# Patient Record
Sex: FEMALE | Race: WHITE | NOT HISPANIC OR LATINO | URBAN - METROPOLITAN AREA
[De-identification: names, ages, dates, MRNs, and addresses within clinical notes are randomized per-mention and may not be internally consistent; named-entity substitution may affect disease eponyms.]

---

## 2022-01-22 ENCOUNTER — EMERGENCY (EMERGENCY)
Facility: HOSPITAL | Age: 52
LOS: 1 days | Discharge: LEFT BEFORE DISCHARGE | End: 2022-01-22
Attending: STUDENT IN AN ORGANIZED HEALTH CARE EDUCATION/TRAINING PROGRAM
Payer: COMMERCIAL

## 2022-01-22 VITALS
DIASTOLIC BLOOD PRESSURE: 94 MMHG | HEART RATE: 74 BPM | OXYGEN SATURATION: 98 % | WEIGHT: 199.96 LBS | RESPIRATION RATE: 20 BRPM | TEMPERATURE: 98 F | HEIGHT: 64 IN | SYSTOLIC BLOOD PRESSURE: 173 MMHG

## 2022-01-22 PROCEDURE — 99284 EMERGENCY DEPT VISIT MOD MDM: CPT

## 2022-01-22 PROCEDURE — 99282 EMERGENCY DEPT VISIT SF MDM: CPT

## 2022-01-22 RX ORDER — SODIUM CHLORIDE 9 MG/ML
1000 INJECTION, SOLUTION INTRAVENOUS ONCE
Refills: 0 | Status: DISCONTINUED | OUTPATIENT
Start: 2022-01-22 | End: 2022-01-27

## 2022-01-22 RX ORDER — ONDANSETRON 8 MG/1
4 TABLET, FILM COATED ORAL ONCE
Refills: 0 | Status: DISCONTINUED | OUTPATIENT
Start: 2022-01-22 | End: 2022-01-27

## 2022-01-22 RX ORDER — MORPHINE SULFATE 50 MG/1
4 CAPSULE, EXTENDED RELEASE ORAL ONCE
Refills: 0 | Status: COMPLETED | OUTPATIENT
Start: 2022-01-22 | End: 2022-01-22

## 2022-01-22 NOTE — ED STATDOCS - PROGRESS NOTE DETAILS
Patent stated she felt much better then when she presented. Patient was informed that I would re-evaluate her but she needed to use the bathroom. Patient walked out after using the bathroom prior to my re-evaluation. She was noted to walk out by nursing staff stating she no longer wants to wait

## 2022-01-22 NOTE — ED STATDOCS - OBJECTIVE STATEMENT
52 y/o female with PMHx of kidney stone, c/o flank pain. Patient was recently dx UTI 3 weeks ago and finished a of 7 day course of meds. Patient reports still having a urgency to urinate and left flank pain which started 2 hours prior. Patient states calling PCP for more medication and  no longer having blood in urine. Patient states pain is similar to when she had a kidney stone in the past. Patient was +covid19 on 12/29. Denies fever or chills.

## 2023-06-08 ENCOUNTER — EMERGENCY (EMERGENCY)
Facility: HOSPITAL | Age: 53
LOS: 1 days | Discharge: HOME | End: 2023-06-08
Attending: EMERGENCY MEDICINE
Payer: COMMERCIAL

## 2023-06-08 VITALS
TEMPERATURE: 98 F | DIASTOLIC BLOOD PRESSURE: 99 MMHG | SYSTOLIC BLOOD PRESSURE: 155 MMHG | RESPIRATION RATE: 20 BRPM | HEIGHT: 64 IN | OXYGEN SATURATION: 99 % | WEIGHT: 220.02 LBS | HEART RATE: 106 BPM

## 2023-06-08 PROCEDURE — 99284 EMERGENCY DEPT VISIT MOD MDM: CPT

## 2023-06-09 VITALS — TEMPERATURE: 98 F | HEART RATE: 75 BPM

## 2023-06-09 LAB
ALBUMIN SERPL ELPH-MCNC: 4.4 G/DL — SIGNIFICANT CHANGE UP (ref 3.3–5.2)
ALP SERPL-CCNC: 93 U/L — SIGNIFICANT CHANGE UP (ref 40–120)
ALT FLD-CCNC: 19 U/L — SIGNIFICANT CHANGE UP
ANION GAP SERPL CALC-SCNC: 12 MMOL/L — SIGNIFICANT CHANGE UP (ref 5–17)
APPEARANCE UR: CLEAR — SIGNIFICANT CHANGE UP
AST SERPL-CCNC: 19 U/L — SIGNIFICANT CHANGE UP
BACTERIA # UR AUTO: ABNORMAL
BASOPHILS # BLD AUTO: 0.02 K/UL — SIGNIFICANT CHANGE UP (ref 0–0.2)
BASOPHILS NFR BLD AUTO: 0.2 % — SIGNIFICANT CHANGE UP (ref 0–2)
BILIRUB SERPL-MCNC: 0.5 MG/DL — SIGNIFICANT CHANGE UP (ref 0.4–2)
BILIRUB UR-MCNC: ABNORMAL
BUN SERPL-MCNC: 17.7 MG/DL — SIGNIFICANT CHANGE UP (ref 8–20)
CALCIUM SERPL-MCNC: 9.6 MG/DL — SIGNIFICANT CHANGE UP (ref 8.4–10.5)
CHLORIDE SERPL-SCNC: 101 MMOL/L — SIGNIFICANT CHANGE UP (ref 96–108)
CO2 SERPL-SCNC: 24 MMOL/L — SIGNIFICANT CHANGE UP (ref 22–29)
COLOR SPEC: ABNORMAL
CREAT SERPL-MCNC: 1.1 MG/DL — SIGNIFICANT CHANGE UP (ref 0.5–1.3)
DIFF PNL FLD: ABNORMAL
EGFR: 59 ML/MIN/1.73M2 — LOW
EOSINOPHIL # BLD AUTO: 0.01 K/UL — SIGNIFICANT CHANGE UP (ref 0–0.5)
EOSINOPHIL NFR BLD AUTO: 0.1 % — SIGNIFICANT CHANGE UP (ref 0–6)
EPI CELLS # UR: SIGNIFICANT CHANGE UP
GLUCOSE SERPL-MCNC: 132 MG/DL — HIGH (ref 70–99)
GLUCOSE UR QL: NEGATIVE — SIGNIFICANT CHANGE UP
HCG SERPL-ACNC: <4 MIU/ML — SIGNIFICANT CHANGE UP
HCT VFR BLD CALC: 39.2 % — SIGNIFICANT CHANGE UP (ref 34.5–45)
HGB BLD-MCNC: 13.2 G/DL — SIGNIFICANT CHANGE UP (ref 11.5–15.5)
IMM GRANULOCYTES NFR BLD AUTO: 0.4 % — SIGNIFICANT CHANGE UP (ref 0–0.9)
KETONES UR-MCNC: ABNORMAL
LEUKOCYTE ESTERASE UR-ACNC: ABNORMAL
LYMPHOCYTES # BLD AUTO: 1.76 K/UL — SIGNIFICANT CHANGE UP (ref 1–3.3)
LYMPHOCYTES # BLD AUTO: 17.3 % — SIGNIFICANT CHANGE UP (ref 13–44)
MCHC RBC-ENTMCNC: 31 PG — SIGNIFICANT CHANGE UP (ref 27–34)
MCHC RBC-ENTMCNC: 33.7 GM/DL — SIGNIFICANT CHANGE UP (ref 32–36)
MCV RBC AUTO: 92 FL — SIGNIFICANT CHANGE UP (ref 80–100)
MONOCYTES # BLD AUTO: 0.77 K/UL — SIGNIFICANT CHANGE UP (ref 0–0.9)
MONOCYTES NFR BLD AUTO: 7.6 % — SIGNIFICANT CHANGE UP (ref 2–14)
NEUTROPHILS # BLD AUTO: 7.58 K/UL — HIGH (ref 1.8–7.4)
NEUTROPHILS NFR BLD AUTO: 74.4 % — SIGNIFICANT CHANGE UP (ref 43–77)
NITRITE UR-MCNC: POSITIVE
PH UR: 6.5 — SIGNIFICANT CHANGE UP (ref 5–8)
PLATELET # BLD AUTO: 292 K/UL — SIGNIFICANT CHANGE UP (ref 150–400)
POTASSIUM SERPL-MCNC: 4.5 MMOL/L — SIGNIFICANT CHANGE UP (ref 3.5–5.3)
POTASSIUM SERPL-SCNC: 4.5 MMOL/L — SIGNIFICANT CHANGE UP (ref 3.5–5.3)
PROT SERPL-MCNC: 7 G/DL — SIGNIFICANT CHANGE UP (ref 6.6–8.7)
PROT UR-MCNC: 15
RBC # BLD: 4.26 M/UL — SIGNIFICANT CHANGE UP (ref 3.8–5.2)
RBC # FLD: 13.4 % — SIGNIFICANT CHANGE UP (ref 10.3–14.5)
RBC CASTS # UR COMP ASSIST: ABNORMAL /HPF (ref 0–4)
SODIUM SERPL-SCNC: 137 MMOL/L — SIGNIFICANT CHANGE UP (ref 135–145)
SP GR SPEC: 1.01 — SIGNIFICANT CHANGE UP (ref 1.01–1.02)
UROBILINOGEN FLD QL: 12
WBC # BLD: 10.18 K/UL — SIGNIFICANT CHANGE UP (ref 3.8–10.5)
WBC # FLD AUTO: 10.18 K/UL — SIGNIFICANT CHANGE UP (ref 3.8–10.5)
WBC UR QL: SIGNIFICANT CHANGE UP /HPF (ref 0–5)

## 2023-06-09 PROCEDURE — 85025 COMPLETE CBC W/AUTO DIFF WBC: CPT

## 2023-06-09 PROCEDURE — 36415 COLL VENOUS BLD VENIPUNCTURE: CPT

## 2023-06-09 PROCEDURE — 80053 COMPREHEN METABOLIC PANEL: CPT

## 2023-06-09 PROCEDURE — 96375 TX/PRO/DX INJ NEW DRUG ADDON: CPT

## 2023-06-09 PROCEDURE — 99284 EMERGENCY DEPT VISIT MOD MDM: CPT | Mod: 25

## 2023-06-09 PROCEDURE — 87086 URINE CULTURE/COLONY COUNT: CPT

## 2023-06-09 PROCEDURE — 84702 CHORIONIC GONADOTROPIN TEST: CPT

## 2023-06-09 PROCEDURE — 74176 CT ABD & PELVIS W/O CONTRAST: CPT | Mod: 26,MA

## 2023-06-09 PROCEDURE — 96374 THER/PROPH/DIAG INJ IV PUSH: CPT

## 2023-06-09 PROCEDURE — 74176 CT ABD & PELVIS W/O CONTRAST: CPT | Mod: MA

## 2023-06-09 PROCEDURE — 81001 URINALYSIS AUTO W/SCOPE: CPT

## 2023-06-09 RX ORDER — CEFTRIAXONE 500 MG/1
1000 INJECTION, POWDER, FOR SOLUTION INTRAMUSCULAR; INTRAVENOUS ONCE
Refills: 0 | Status: COMPLETED | OUTPATIENT
Start: 2023-06-09 | End: 2023-06-09

## 2023-06-09 RX ORDER — PHENAZOPYRIDINE HCL 100 MG
200 TABLET ORAL ONCE
Refills: 0 | Status: COMPLETED | OUTPATIENT
Start: 2023-06-09 | End: 2023-06-09

## 2023-06-09 RX ORDER — TAMSULOSIN HYDROCHLORIDE 0.4 MG/1
1 CAPSULE ORAL
Qty: 7 | Refills: 0
Start: 2023-06-09 | End: 2023-06-15

## 2023-06-09 RX ORDER — IBUPROFEN 200 MG
1 TABLET ORAL
Qty: 28 | Refills: 0
Start: 2023-06-09 | End: 2023-06-15

## 2023-06-09 RX ORDER — CEFTRIAXONE 500 MG/1
1000 INJECTION, POWDER, FOR SOLUTION INTRAMUSCULAR; INTRAVENOUS ONCE
Refills: 0 | Status: DISCONTINUED | OUTPATIENT
Start: 2023-06-09 | End: 2023-06-09

## 2023-06-09 RX ORDER — OXYCODONE AND ACETAMINOPHEN 5; 325 MG/1; MG/1
1 TABLET ORAL
Qty: 9 | Refills: 0
Start: 2023-06-09 | End: 2023-06-11

## 2023-06-09 RX ORDER — CEFPODOXIME PROXETIL 100 MG
1 TABLET ORAL
Qty: 14 | Refills: 0
Start: 2023-06-09 | End: 2023-06-15

## 2023-06-09 RX ORDER — ONDANSETRON 8 MG/1
4 TABLET, FILM COATED ORAL ONCE
Refills: 0 | Status: COMPLETED | OUTPATIENT
Start: 2023-06-09 | End: 2023-06-09

## 2023-06-09 RX ORDER — SODIUM CHLORIDE 9 MG/ML
1000 INJECTION INTRAMUSCULAR; INTRAVENOUS; SUBCUTANEOUS ONCE
Refills: 0 | Status: COMPLETED | OUTPATIENT
Start: 2023-06-09 | End: 2023-06-09

## 2023-06-09 RX ORDER — KETOROLAC TROMETHAMINE 30 MG/ML
15 SYRINGE (ML) INJECTION ONCE
Refills: 0 | Status: DISCONTINUED | OUTPATIENT
Start: 2023-06-09 | End: 2023-06-09

## 2023-06-09 RX ADMIN — CEFTRIAXONE 1000 MILLIGRAM(S): 500 INJECTION, POWDER, FOR SOLUTION INTRAMUSCULAR; INTRAVENOUS at 04:00

## 2023-06-09 RX ADMIN — ONDANSETRON 4 MILLIGRAM(S): 8 TABLET, FILM COATED ORAL at 02:06

## 2023-06-09 RX ADMIN — Medication 200 MILLIGRAM(S): at 02:28

## 2023-06-09 RX ADMIN — Medication 15 MILLIGRAM(S): at 02:07

## 2023-06-09 RX ADMIN — SODIUM CHLORIDE 1000 MILLILITER(S): 9 INJECTION INTRAMUSCULAR; INTRAVENOUS; SUBCUTANEOUS at 02:09

## 2023-06-09 NOTE — ED PROVIDER NOTE - PATIENT PORTAL LINK FT
You can access the FollowMyHealth Patient Portal offered by [facility] by registering at the following website: [email or web address]. By joining [employer]’s FollowMyHealth portal, you will also be able to view your health information using other applications (apps) compatible with our system.

## 2023-06-09 NOTE — ED PROVIDER NOTE - ATTENDING APP SHARED VISIT CONTRIBUTION OF CARE
52 yo F with hx of renal stones c/o dysuria x last 6 weeks.  Pt Rx'd with Abx despite reported neg UA by PMD, currently on Pyridium for pain.  Today with increased R flank pain with N/V.  no fever or chills.  Will check labs, UA, CT renal stone hunt and medicate for pain and re-eval

## 2023-06-09 NOTE — ED PROVIDER NOTE - CLINICAL SUMMARY MEDICAL DECISION MAKING FREE TEXT BOX
54yo F p/w dysuria, R flank pain, and N/V. on initial eval, non-toxic appearing female found sitting in stretcher without apparent distress, mild RLQ and R flank ttp, no CVAT, remainder of PE WNL. ordered IVF, Toradol and an for sx relief. ordered labs, UA/UC and CT renal to r/o pyelo vs renal stone. 52yo F p/w dysuria, R flank pain, and N/V. on initial eval, non-toxic appearing female found sitting in stretcher without apparent distress, mild RLQ and R flank ttp, no CVAT, remainder of PE WNL. ordered IVF, Toradol and an for sx relief. labs WNL, no leukocytosis, renal fnx norm. UA/UC positive for UTI. CT revealed 7mm stone in UVJ. ordered ceftriaxone and sent abx to pharm. referred to uro. discussed supportive care measures and return precautions. pt verbalized understanding and agreement

## 2023-06-09 NOTE — ED PROVIDER NOTE - CARE PROVIDER_API CALL
Rey Lyn  Urology  [address], [address]  [place] [address]  Phone: [phone]  Fax: [phone]  Follow Up Time:

## 2023-06-09 NOTE — ED PROVIDER NOTE - OBJECTIVE STATEMENT
54yo F PMH breast cancer (in remission x7y), fibromyalgia presents to ED c/o R flank pain and dysuria x6w. pain radiated to RLQ and R side of back. pt also reports hematuria. pt f/u with PCP: has had multiple UA/UC which were negative, pt was tx with PO abx, finished dose 10d ago- pt reports improvement while on abx but pain reoccurred shortly after finishing dose. pt had renal US 3w ago which was neg for pyelo or renal stone. pt has apt with nephrologist in July and was referred to urologist by PCP. pt reports having kidney stone 1y ago, tx with stents. pain is partly removed with OTC pain meds and Azo. pt reports coming to ED today bc pain was the worst pain she has ever felt and was accompanied by N/V (3 episodes). pt denies fever, urinary frequency/urgency

## 2023-06-09 NOTE — ED PROVIDER NOTE - PHYSICAL EXAMINATION
General: non-toxic appearing, in no acute distress, A+Ox3  CV: RRR, nl s1/s2.  Resp: CTAB, normal rate and effort  GI: Abdomen soft, obese, mild left flank and LLQ ttp  : No CVAT.

## 2023-06-09 NOTE — ED ADULT NURSE NOTE - OBJECTIVE STATEMENT
pt is a 53y female aox4, ambulatory.  c/o right flank pain with nausea, vomiting since this morning.  pt has hx kidney stones.  also c/o dysuria.  denies sob, cp, fever, body aches, chills.  no s/s acute distress noted.

## 2023-06-11 LAB
CULTURE RESULTS: SIGNIFICANT CHANGE UP
SPECIMEN SOURCE: SIGNIFICANT CHANGE UP

## 2024-09-22 ENCOUNTER — TRANSCRIPTION ENCOUNTER (OUTPATIENT)
Age: 54
End: 2024-09-22

## 2024-09-23 ENCOUNTER — INPATIENT (INPATIENT)
Facility: HOSPITAL | Age: 54
LOS: 2 days | Discharge: HOME | DRG: 659 | End: 2024-09-26
Attending: UROLOGY | Admitting: UROLOGY
Payer: COMMERCIAL

## 2024-09-23 VITALS
TEMPERATURE: 99 F | OXYGEN SATURATION: 96 % | HEART RATE: 136 BPM | WEIGHT: 176.15 LBS | DIASTOLIC BLOOD PRESSURE: 94 MMHG | SYSTOLIC BLOOD PRESSURE: 142 MMHG | RESPIRATION RATE: 18 BRPM

## 2024-09-23 DIAGNOSIS — Z98.890 OTHER SPECIFIED POSTPROCEDURAL STATES: Chronic | ICD-10-CM

## 2024-09-23 DIAGNOSIS — N20.0 CALCULUS OF KIDNEY: ICD-10-CM

## 2024-09-23 LAB
ALBUMIN SERPL ELPH-MCNC: 3.9 G/DL — SIGNIFICANT CHANGE UP (ref 3.3–5.2)
ALP SERPL-CCNC: 87 U/L — SIGNIFICANT CHANGE UP (ref 40–120)
ALT FLD-CCNC: 39 U/L — HIGH
ANION GAP SERPL CALC-SCNC: 14 MMOL/L — SIGNIFICANT CHANGE UP (ref 5–17)
APPEARANCE UR: CLEAR — SIGNIFICANT CHANGE UP
APTT BLD: 29.5 SEC — SIGNIFICANT CHANGE UP (ref 24.5–35.6)
AST SERPL-CCNC: 40 U/L — HIGH
BACTERIA # UR AUTO: ABNORMAL /HPF
BASOPHILS # BLD AUTO: 0.02 K/UL — SIGNIFICANT CHANGE UP (ref 0–0.2)
BASOPHILS NFR BLD AUTO: 0.3 % — SIGNIFICANT CHANGE UP (ref 0–2)
BILIRUB SERPL-MCNC: 0.4 MG/DL — SIGNIFICANT CHANGE UP (ref 0.4–2)
BILIRUB UR-MCNC: NEGATIVE — SIGNIFICANT CHANGE UP
BUN SERPL-MCNC: 11 MG/DL — SIGNIFICANT CHANGE UP (ref 8–20)
CALCIUM SERPL-MCNC: 8.8 MG/DL — SIGNIFICANT CHANGE UP (ref 8.4–10.5)
CAST: 0 /LPF — SIGNIFICANT CHANGE UP (ref 0–4)
CHLORIDE SERPL-SCNC: 103 MMOL/L — SIGNIFICANT CHANGE UP (ref 96–108)
CO2 SERPL-SCNC: 19 MMOL/L — LOW (ref 22–29)
COLOR SPEC: YELLOW — SIGNIFICANT CHANGE UP
CREAT SERPL-MCNC: 0.69 MG/DL — SIGNIFICANT CHANGE UP (ref 0.5–1.3)
DIFF PNL FLD: ABNORMAL
EGFR: 102 ML/MIN/1.73M2 — SIGNIFICANT CHANGE UP
EOSINOPHIL # BLD AUTO: 0.01 K/UL — SIGNIFICANT CHANGE UP (ref 0–0.5)
EOSINOPHIL NFR BLD AUTO: 0.1 % — SIGNIFICANT CHANGE UP (ref 0–6)
GAS PNL BLDV: SIGNIFICANT CHANGE UP
GLUCOSE SERPL-MCNC: 129 MG/DL — HIGH (ref 70–99)
GLUCOSE UR QL: NEGATIVE MG/DL — SIGNIFICANT CHANGE UP
HCT VFR BLD CALC: 39.9 % — SIGNIFICANT CHANGE UP (ref 34.5–45)
HGB BLD-MCNC: 13.6 G/DL — SIGNIFICANT CHANGE UP (ref 11.5–15.5)
IMM GRANULOCYTES NFR BLD AUTO: 0.4 % — SIGNIFICANT CHANGE UP (ref 0–0.9)
INR BLD: 1.26 RATIO — HIGH (ref 0.85–1.18)
KETONES UR-MCNC: NEGATIVE MG/DL — SIGNIFICANT CHANGE UP
LEUKOCYTE ESTERASE UR-ACNC: ABNORMAL
LYMPHOCYTES # BLD AUTO: 0.73 K/UL — LOW (ref 1–3.3)
LYMPHOCYTES # BLD AUTO: 9.2 % — LOW (ref 13–44)
MCHC RBC-ENTMCNC: 31.3 PG — SIGNIFICANT CHANGE UP (ref 27–34)
MCHC RBC-ENTMCNC: 34.1 GM/DL — SIGNIFICANT CHANGE UP (ref 32–36)
MCV RBC AUTO: 91.9 FL — SIGNIFICANT CHANGE UP (ref 80–100)
MONOCYTES # BLD AUTO: 0.39 K/UL — SIGNIFICANT CHANGE UP (ref 0–0.9)
MONOCYTES NFR BLD AUTO: 4.9 % — SIGNIFICANT CHANGE UP (ref 2–14)
NEUTROPHILS # BLD AUTO: 6.76 K/UL — SIGNIFICANT CHANGE UP (ref 1.8–7.4)
NEUTROPHILS NFR BLD AUTO: 85.1 % — HIGH (ref 43–77)
NITRITE UR-MCNC: NEGATIVE — SIGNIFICANT CHANGE UP
PH UR: 6.5 — SIGNIFICANT CHANGE UP (ref 5–8)
PLATELET # BLD AUTO: 231 K/UL — SIGNIFICANT CHANGE UP (ref 150–400)
POTASSIUM SERPL-MCNC: 4.1 MMOL/L — SIGNIFICANT CHANGE UP (ref 3.5–5.3)
POTASSIUM SERPL-SCNC: 4.1 MMOL/L — SIGNIFICANT CHANGE UP (ref 3.5–5.3)
PROT SERPL-MCNC: 6.9 G/DL — SIGNIFICANT CHANGE UP (ref 6.6–8.7)
PROT UR-MCNC: NEGATIVE MG/DL — SIGNIFICANT CHANGE UP
PROTHROM AB SERPL-ACNC: 13.9 SEC — HIGH (ref 9.5–13)
RBC # BLD: 4.34 M/UL — SIGNIFICANT CHANGE UP (ref 3.8–5.2)
RBC # FLD: 12.8 % — SIGNIFICANT CHANGE UP (ref 10.3–14.5)
RBC CASTS # UR COMP ASSIST: 82 /HPF — HIGH (ref 0–4)
SODIUM SERPL-SCNC: 136 MMOL/L — SIGNIFICANT CHANGE UP (ref 135–145)
SP GR SPEC: 1.01 — SIGNIFICANT CHANGE UP (ref 1–1.03)
SQUAMOUS # UR AUTO: 4 /HPF — SIGNIFICANT CHANGE UP (ref 0–5)
UROBILINOGEN FLD QL: 0.2 MG/DL — SIGNIFICANT CHANGE UP (ref 0.2–1)
WBC # BLD: 7.94 K/UL — SIGNIFICANT CHANGE UP (ref 3.8–10.5)
WBC # FLD AUTO: 7.94 K/UL — SIGNIFICANT CHANGE UP (ref 3.8–10.5)
WBC UR QL: 30 /HPF — HIGH (ref 0–5)

## 2024-09-23 PROCEDURE — 71045 X-RAY EXAM CHEST 1 VIEW: CPT | Mod: 26

## 2024-09-23 PROCEDURE — 74177 CT ABD & PELVIS W/CONTRAST: CPT | Mod: 26,MC

## 2024-09-23 PROCEDURE — 99285 EMERGENCY DEPT VISIT HI MDM: CPT

## 2024-09-23 PROCEDURE — 99223 1ST HOSP IP/OBS HIGH 75: CPT | Mod: 25

## 2024-09-23 PROCEDURE — 52332 CYSTOSCOPY AND TREATMENT: CPT | Mod: LT

## 2024-09-23 PROCEDURE — 93010 ELECTROCARDIOGRAM REPORT: CPT

## 2024-09-23 DEVICE — GUIDEWIRE NICORE NITINOL STRAIGHT .035" X 150CM: Type: IMPLANTABLE DEVICE | Status: FUNCTIONAL

## 2024-09-23 DEVICE — URETERAL STENT CONTOUR 6FR 24CM: Type: IMPLANTABLE DEVICE | Status: FUNCTIONAL

## 2024-09-23 DEVICE — URETERAL CATH AXXCESS OPEN END 6FR 70CM: Type: IMPLANTABLE DEVICE | Status: FUNCTIONAL

## 2024-09-23 DEVICE — IMPLANTABLE DEVICE: Type: IMPLANTABLE DEVICE | Status: FUNCTIONAL

## 2024-09-23 RX ORDER — KETOROLAC TROMETHAMINE 10 MG/1
15 TABLET, FILM COATED ORAL EVERY 6 HOURS
Refills: 0 | Status: DISCONTINUED | OUTPATIENT
Start: 2024-09-23 | End: 2024-09-26

## 2024-09-23 RX ORDER — AZITHROMYCIN 250 MG/1
1 TABLET, FILM COATED ORAL
Qty: 1 | Refills: 0
Start: 2024-09-23 | End: 2024-09-23

## 2024-09-23 RX ORDER — TAMSULOSIN HCL 0.4 MG
0.4 CAPSULE ORAL ONCE
Refills: 0 | Status: COMPLETED | OUTPATIENT
Start: 2024-09-23 | End: 2024-09-23

## 2024-09-23 RX ORDER — PIPERACILLIN SODIUM AND TAZOBACTAM SODIUM 12; 1.5 G/60ML; G/60ML
3.38 INJECTION, POWDER, LYOPHILIZED, FOR SOLUTION INTRAVENOUS ONCE
Refills: 0 | Status: COMPLETED | OUTPATIENT
Start: 2024-09-23 | End: 2024-09-23

## 2024-09-23 RX ORDER — OXYCODONE AND ACETAMINOPHEN 5; 325 MG/1; MG/1
1 TABLET ORAL
Qty: 12 | Refills: 0
Start: 2024-09-23 | End: 2024-09-25

## 2024-09-23 RX ORDER — PIPERACILLIN SODIUM AND TAZOBACTAM SODIUM 12; 1.5 G/60ML; G/60ML
3.38 INJECTION, POWDER, LYOPHILIZED, FOR SOLUTION INTRAVENOUS EVERY 8 HOURS
Refills: 0 | Status: DISCONTINUED | OUTPATIENT
Start: 2024-09-24 | End: 2024-09-24

## 2024-09-23 RX ORDER — IPRATROPIUM BROMIDE AND ALBUTEROL SULFATE .5; 3 MG/3ML; MG/3ML
3 SOLUTION RESPIRATORY (INHALATION) EVERY 6 HOURS
Refills: 0 | Status: DISCONTINUED | OUTPATIENT
Start: 2024-09-24 | End: 2024-09-26

## 2024-09-23 RX ORDER — HYDROMORPHONE HYDROCHLORIDE 1 MG/ML
0.5 INJECTION, SOLUTION INTRAMUSCULAR; INTRAVENOUS; SUBCUTANEOUS EVERY 6 HOURS
Refills: 0 | Status: DISCONTINUED | OUTPATIENT
Start: 2024-09-23 | End: 2024-09-26

## 2024-09-23 RX ORDER — GUAIFENESIN 100 MG/5ML
100 SOLUTION ORAL EVERY 6 HOURS
Refills: 0 | Status: DISCONTINUED | OUTPATIENT
Start: 2024-09-23 | End: 2024-09-26

## 2024-09-23 RX ORDER — SODIUM CHLORIDE IRRIG SOLUTION 0.9 %
1000 SOLUTION, IRRIGATION IRRIGATION
Refills: 0 | Status: DISCONTINUED | OUTPATIENT
Start: 2024-09-23 | End: 2024-09-23

## 2024-09-23 RX ORDER — OXYCODONE HYDROCHLORIDE 30 MG/1
10 TABLET, FILM COATED, EXTENDED RELEASE ORAL EVERY 4 HOURS
Refills: 0 | Status: DISCONTINUED | OUTPATIENT
Start: 2024-09-23 | End: 2024-09-26

## 2024-09-23 RX ORDER — ONDANSETRON HCL/PF 4 MG/2 ML
4 VIAL (ML) INJECTION ONCE
Refills: 0 | Status: DISCONTINUED | OUTPATIENT
Start: 2024-09-23 | End: 2024-09-23

## 2024-09-23 RX ORDER — HYDROMORPHONE HYDROCHLORIDE 1 MG/ML
0.5 INJECTION, SOLUTION INTRAMUSCULAR; INTRAVENOUS; SUBCUTANEOUS
Refills: 0 | Status: DISCONTINUED | OUTPATIENT
Start: 2024-09-23 | End: 2024-09-23

## 2024-09-23 RX ORDER — FENTANYL CITRATE-0.9 % NACL/PF 300MCG/30
25 PATIENT CONTROLLED ANALGESIA VIAL INJECTION
Refills: 0 | Status: DISCONTINUED | OUTPATIENT
Start: 2024-09-23 | End: 2024-09-23

## 2024-09-23 RX ORDER — SODIUM CHLORIDE IRRIG SOLUTION 0.9 %
2500 SOLUTION, IRRIGATION IRRIGATION ONCE
Refills: 0 | Status: COMPLETED | OUTPATIENT
Start: 2024-09-23 | End: 2024-09-23

## 2024-09-23 RX ORDER — OXYBUTYNIN CHLORIDE 5 MG
5 TABLET ORAL THREE TIMES A DAY
Refills: 0 | Status: DISCONTINUED | OUTPATIENT
Start: 2024-09-23 | End: 2024-09-26

## 2024-09-23 RX ORDER — CEFTRIAXONE SODIUM 1 G
1000 VIAL (EA) INJECTION ONCE
Refills: 0 | Status: DISCONTINUED | OUTPATIENT
Start: 2024-09-23 | End: 2024-09-23

## 2024-09-23 RX ORDER — ACETAMINOPHEN 325 MG
1000 TABLET ORAL ONCE
Refills: 0 | Status: COMPLETED | OUTPATIENT
Start: 2024-09-23 | End: 2024-09-23

## 2024-09-23 RX ORDER — IPRATROPIUM BROMIDE AND ALBUTEROL SULFATE .5; 3 MG/3ML; MG/3ML
3 SOLUTION RESPIRATORY (INHALATION) ONCE
Refills: 0 | Status: COMPLETED | OUTPATIENT
Start: 2024-09-23 | End: 2024-09-23

## 2024-09-23 RX ORDER — KETOROLAC TROMETHAMINE 10 MG/1
15 TABLET, FILM COATED ORAL ONCE
Refills: 0 | Status: DISCONTINUED | OUTPATIENT
Start: 2024-09-23 | End: 2024-09-23

## 2024-09-23 RX ORDER — ACETAMINOPHEN 325 MG
975 TABLET ORAL EVERY 6 HOURS
Refills: 0 | Status: DISCONTINUED | OUTPATIENT
Start: 2024-09-23 | End: 2024-09-26

## 2024-09-23 RX ORDER — INFLUENZA VIRUS VACCINE 15; 15; 15; 15 UG/.5ML; UG/.5ML; UG/.5ML; UG/.5ML
0.5 SUSPENSION INTRAMUSCULAR ONCE
Refills: 0 | Status: DISCONTINUED | OUTPATIENT
Start: 2024-09-23 | End: 2024-09-26

## 2024-09-23 RX ORDER — ACETAMINOPHEN 325 MG
975 TABLET ORAL EVERY 6 HOURS
Refills: 0 | Status: DISCONTINUED | OUTPATIENT
Start: 2024-09-23 | End: 2024-09-23

## 2024-09-23 RX ORDER — TAMSULOSIN HCL 0.4 MG
1 CAPSULE ORAL
Qty: 7 | Refills: 0
Start: 2024-09-23 | End: 2024-09-29

## 2024-09-23 RX ORDER — PIPERACILLIN SODIUM AND TAZOBACTAM SODIUM 12; 1.5 G/60ML; G/60ML
3.38 INJECTION, POWDER, LYOPHILIZED, FOR SOLUTION INTRAVENOUS EVERY 8 HOURS
Refills: 0 | Status: DISCONTINUED | OUTPATIENT
Start: 2024-09-23 | End: 2024-09-23

## 2024-09-23 RX ORDER — MORPHINE SULFATE 30 MG/1
4 TABLET, FILM COATED, EXTENDED RELEASE ORAL ONCE
Refills: 0 | Status: DISCONTINUED | OUTPATIENT
Start: 2024-09-23 | End: 2024-09-23

## 2024-09-23 RX ORDER — ONDANSETRON HCL/PF 4 MG/2 ML
1 VIAL (ML) INJECTION
Qty: 16 | Refills: 0
Start: 2024-09-23 | End: 2024-09-26

## 2024-09-23 RX ORDER — OXYCODONE HYDROCHLORIDE 30 MG/1
5 TABLET, FILM COATED, EXTENDED RELEASE ORAL EVERY 4 HOURS
Refills: 0 | Status: DISCONTINUED | OUTPATIENT
Start: 2024-09-23 | End: 2024-09-26

## 2024-09-23 RX ORDER — CEFTRIAXONE SODIUM 1 G
1000 VIAL (EA) INJECTION ONCE
Refills: 0 | Status: COMPLETED | OUTPATIENT
Start: 2024-09-23 | End: 2024-09-23

## 2024-09-23 RX ORDER — ONDANSETRON HCL/PF 4 MG/2 ML
4 VIAL (ML) INJECTION ONCE
Refills: 0 | Status: DISCONTINUED | OUTPATIENT
Start: 2024-09-23 | End: 2024-09-26

## 2024-09-23 RX ADMIN — KETOROLAC TROMETHAMINE 15 MILLIGRAM(S): 10 TABLET, FILM COATED ORAL at 19:39

## 2024-09-23 RX ADMIN — KETOROLAC TROMETHAMINE 15 MILLIGRAM(S): 10 TABLET, FILM COATED ORAL at 04:49

## 2024-09-23 RX ADMIN — Medication 0.4 MILLIGRAM(S): at 04:49

## 2024-09-23 RX ADMIN — PIPERACILLIN SODIUM AND TAZOBACTAM SODIUM 200 GRAM(S): 12; 1.5 INJECTION, POWDER, LYOPHILIZED, FOR SOLUTION INTRAVENOUS at 10:48

## 2024-09-23 RX ADMIN — Medication 600 MILLIGRAM(S): at 13:23

## 2024-09-23 RX ADMIN — Medication 2500 MILLILITER(S): at 01:43

## 2024-09-23 RX ADMIN — MORPHINE SULFATE 4 MILLIGRAM(S): 30 TABLET, FILM COATED, EXTENDED RELEASE ORAL at 01:43

## 2024-09-23 RX ADMIN — Medication 1000 MILLIGRAM(S): at 01:43

## 2024-09-23 RX ADMIN — Medication 25 MICROGRAM(S): at 19:59

## 2024-09-23 RX ADMIN — Medication 975 MILLIGRAM(S): at 15:50

## 2024-09-23 RX ADMIN — Medication 975 MILLIGRAM(S): at 21:00

## 2024-09-23 RX ADMIN — Medication 0.4 MILLIGRAM(S): at 07:32

## 2024-09-23 RX ADMIN — Medication 975 MILLIGRAM(S): at 10:48

## 2024-09-23 RX ADMIN — PIPERACILLIN SODIUM AND TAZOBACTAM SODIUM 25 GRAM(S): 12; 1.5 INJECTION, POWDER, LYOPHILIZED, FOR SOLUTION INTRAVENOUS at 13:23

## 2024-09-23 RX ADMIN — Medication 5 MILLIGRAM(S): at 19:44

## 2024-09-23 RX ADMIN — Medication 120 MILLILITER(S): at 10:04

## 2024-09-23 RX ADMIN — Medication 2500 MILLILITER(S): at 03:15

## 2024-09-23 RX ADMIN — Medication 120 MILLILITER(S): at 19:15

## 2024-09-23 RX ADMIN — Medication 400 MILLIGRAM(S): at 01:43

## 2024-09-23 RX ADMIN — KETOROLAC TROMETHAMINE 15 MILLIGRAM(S): 10 TABLET, FILM COATED ORAL at 19:59

## 2024-09-23 RX ADMIN — Medication 975 MILLIGRAM(S): at 19:53

## 2024-09-23 RX ADMIN — Medication 25 MICROGRAM(S): at 19:53

## 2024-09-23 NOTE — ED ADULT NURSE NOTE - NSFALLUNIVINTERV_ED_ALL_ED
Bed/Stretcher in lowest position, wheels locked, appropriate side rails in place/Call bell, personal items and telephone in reach/Instruct patient to call for assistance before getting out of bed/chair/stretcher/Non-slip footwear applied when patient is off stretcher/[place] to call system/Physically safe environment - no spills, clutter or unnecessary equipment/Purposeful proactive rounding/Room/bathroom lighting operational, light cord in reach

## 2024-09-23 NOTE — PATIENT PROFILE ADULT - FUNCTIONAL ASSESSMENT - DAILY ACTIVITY 6.
4 = No assist / stand by assistance Xerosis Normal Treatment: I recommended application of Cetaphil or CeraVe numerous times a day and before going to bed to all dry areas.

## 2024-09-23 NOTE — H&P ADULT - ASSESSMENT
55 yo female with septic stone, pyelonephritis, possible RLL PNA  - admit to urology  - IVF  - zosyn  - tamsulosin  - strainer provided to pt  - f/u blood/urine cultures  - plan for OR today- left ureteral stent insertion  - explained to pt and  only stent will be inserted today, no stone manipulation in the presence of infection, all questions answered to their satisfaction

## 2024-09-23 NOTE — ED PROVIDER NOTE - ATTENDING CONTRIBUTION TO CARE
54F hx renal stones, BRCA in remission presents with left flank pain with associated nausea and fever (Tmax 103.2F earlier tonight). Pt and  at bedside state 2 days ago pt had an episode of hematuria, subsequently developed flank pain fever and nausea. Denies CP, SOB, vomiting, abd pain. States pain is dull and dissimilar to her renal stone pain. 54F hx renal stones, BRCA in remission presents with left flank pain with associated nausea and fever (Tmax 103.2F earlier tonight). Pt and  at bedside state 2 days ago pt had an episode of hematuria, subsequently developed flank pain fever and nausea. Denies CP, SOB, vomiting, abd pain. States pain is dull and dissimilar to her renal stone pain.    I, Renee Armando, personally saw the patient with the resident, and completed the key components of the history and physical exam. I then discussed the management plan with the resident.

## 2024-09-23 NOTE — H&P ADULT - NSHPLABSRESULTS_GEN_ALL_CORE
13.6   7.94  )-----------( 231      ( 23 Sep 2024 01:30 )             39.9         136  |  103  |  11.0  ----------------------------<  129[H]  4.1   |  19.0[L]  |  0.69    Ca    8.8      23 Sep 2024 01:30    TPro  6.9  /  Alb  3.9  /  TBili  0.4  /  DBili  x   /  AST  40[H]  /  ALT  39[H]  /  AlkPhos  87      Urinalysis Basic - ( 23 Sep 2024 03:05 )    Color: Yellow / Appearance: Clear / S.009 / pH: x  Gluc: x / Ketone: Negative mg/dL  / Bili: Negative / Urobili: 0.2 mg/dL   Blood: x / Protein: Negative mg/dL / Nitrite: Negative   Leuk Esterase: Moderate / RBC: 82 /HPF / WBC 30 /HPF   Sq Epi: x / Non Sq Epi: 4 /HPF / Bacteria: Occasional /HPF    Radiology:  < from: CT Abdomen and Pelvis w/ IV Cont (24 @ 04:08) >    ACC: [id number] EXAM:  CT ABDOMEN AND PELVIS IC   ORDERED BY: AUGUSTO KIM     PROCEDURE DATE:  2024          INTERPRETATION:  CLINICAL INFORMATION: Sepsis, back pain, history of   renal stones.    COMPARISON: 2023.    CONTRAST/COMPLICATIONS:  IV Contrast: Omnipaque 350  90 cc administered   10 cc discarded  Oral Contrast: NONE  Complications: None reported at time of study completion    PROCEDURE:  CT of the Abdomen and Pelvis was performed.  Sagittal and coronal reformats were performed.    FINDINGS:  LOWER CHEST: Patchy consolidation and small adjacent groundglass in the   right lower lobe concerning for pneumonia in the appropriate clinical   setting.    LIVER: Within normal limits.  BILE DUCTS: Normal caliber.  GALLBLADDER: Within normal limits.  SPLEEN: Within normal limits.  PANCREAS: Within normal limits.  ADRENALS: Within normal limits.  KIDNEYS/URETERS: Ptotic left kidney. 3 mm calculus at the proximal left   ureter with mild left hydronephrosis and mild perinephric fat stranding.   No right hydronephrosis. Subcentimeter right-sided hypodensities too   small to characterize.    BLADDER: Minimally distended, grossly unremarkable.  REPRODUCTIVE ORGANS:    BOWEL: No bowel obstruction. Appendix  PERITONEUM/RETROPERITONEUM: Within normal limits.  VESSELS: Within normal limits.  LYMPH NODES: No lymphadenopathy.  ABDOMINAL WALL: Within normal limits.  BONES: Within normal limits.    IMPRESSION:  3 mm calculus in the proximal left ureter with mild left hydronephrosis   and mild left perinephric fat stranding.    Patchy consolidation and small adjacent groundglass in the right lower   lobe concerning for pneumonia in the appropriate clinical setting.    < end of copied text >

## 2024-09-23 NOTE — ED PROVIDER NOTE - NSFOLLOWUPINSTRUCTIONS_ED_ALL_ED_FT
1) Follow up with your doctor in 1-2 days  2) Return to the ER for worsening or concerning symptoms  3) take medication as prescribed    Urinary Tract Infection, Adult       A urinary tract infection (UTI) is an infection of any part of the urinary tract. The urinary tract includes the kidneys, ureters, bladder, and urethra. These organs make, store, and get rid of urine in the body.    Your health care provider may use other names to describe the infection. An upper UTI affects the ureters and kidneys (pyelonephritis). A lower UTI affects the bladder (cystitis) and urethra (urethritis).    What are the causes?  Most urinary tract infections are caused by bacteria in your genital area, around the entrance to your urinary tract (urethra). These bacteria grow and cause inflammation of your urinary tract.    What increases the risk?  You are more likely to develop this condition if:    You have a urinary catheter that stays in place (indwelling).  You are not able to control when you urinate or have a bowel movement (you have incontinence).  You are female and you:    Use a spermicide or diaphragm for birth control.  Have low estrogen levels.  Are pregnant.  You have certain genes that increase your risk (genetics).  You are sexually active.  You take antibiotic medicines.  You have a condition that causes your flow of urine to slow down, such as:    An enlarged prostate, if you are male.  Blockage in your urethra (stricture).  A kidney stone.  A nerve condition that affects your bladder control (neurogenic bladder).  Not getting enough to drink, or not urinating often.  You have certain medical conditions, such as:    Diabetes.  A weak disease-fighting system (immunesystem).  Sickle cell disease.  Gout.  Spinal cord injury.    What are the signs or symptoms?  Symptoms of this condition include:    Needing to urinate right away (urgently).  Frequent urination or passing small amounts of urine frequently.  Pain or burning with urination.  Blood in the urine.  Urine that smells bad or unusual.  Trouble urinating.  Cloudy urine.  Vaginal discharge, if you are female.  Pain in the abdomen or the lower back.    You may also have:    Vomiting or a decreased appetite.  Confusion.  Irritability or tiredness.  A fever.  Diarrhea.    The first symptom in older adults may be confusion. In some cases, they may not have any symptoms until the infection has worsened.    How is this diagnosed?  This condition is diagnosed based on your medical history and a physical exam. You may also have other tests, including:    Urine tests.  Blood tests.  Tests for sexually transmitted infections (STIs).    If you have had more than one UTI, a cystoscopy or imaging studies may be done to determine the cause of the infections.    How is this treated?  Treatment for this condition includes:    Antibiotic medicine.  Over-the-counter medicines to treat discomfort.  Drinking enough water to stay hydrated.    If you have frequent infections or have other conditions such as a kidney stone, you may need to see a health care provider who specializes in the urinary tract (urologist).    In rare cases, urinary tract infections can cause sepsis. Sepsis is a life-threatening condition that occurs when the body responds to an infection. Sepsis is treated in the hospital with IV antibiotics, fluids, and other medicines.    Follow these instructions at home:         Medicines    Take over-the-counter and prescription medicines only as told by your health care provider.  If you were prescribed an antibiotic medicine, take it as told by your health care provider. Do not stop using the antibiotic even if you start to feel better.        General instructions    Make sure you:    Empty your bladder often and completely. Do not hold urine for long periods of time.  Empty your bladder after sex.  Wipe from front to back after a bowel movement if you are female. Use each tissue one time when you wipe.  Drink enough fluid to keep your urine pale yellow.  Keep all follow-up visits as told by your health care provider. This is important.    Contact a health care provider if:  Your symptoms do not get better after 1–2 days.  Your symptoms go away and then return.    Get help right away if you have:  Severe pain in your back or your lower abdomen.  A fever.  Nausea or vomiting.    Summary  A urinary tract infection (UTI) is an infection of any part of the urinary tract, which includes the kidneys, ureters, bladder, and urethra.  Most urinary tract infections are caused by bacteria in your genital area, around the entrance to your urinary tract (urethra).  Treatment for this condition often includes antibiotic medicines.  If you were prescribed an antibiotic medicine, take it as told by your health care provider. Do not stop using the antibiotic even if you start to feel better.  Keep all follow-up visits as told by your health care provider. This is important.    ADDITIONAL NOTES AND INSTRUCTIONS    Please follow up with your Primary MD in 24-48 hr.  Seek immediate medical care for any new/worsening signs or symptoms.     Document Released: 9/27/2006 Document Revised: 12/5/2019 Document Reviewed: 6/27/2019  [employer] Interactive Patient Education ©2019 [employer] This information is not intended to replace advice given to you by your health care provider. Make sure you discuss any questions you have with your health care provider.      Kidney Stones       Kidney stones are solid, rock-like deposits that form inside of the kidneys. The kidneys are a pair of organs that make urine. A kidney stone may form in a kidney and move into other parts of the urinary tract, including the tubes that connect the kidneys to the bladder (ureters), the bladder, and the tube that carries urine out of the body (urethra). As the stone moves through these areas, it can cause intense pain and block the flow of urine.    Kidney stones are created when high levels of certain minerals are found in the urine. The stones are usually passed out of the body through urination, but in some cases, medical treatment may be needed to remove them.    What are the causes?  Kidney stones may be caused by:    A condition in which certain glands produce too much parathyroid hormone (primary hyperparathyroidism), which causes too much calcium buildup in the blood.  A buildup of uric acid crystals in the bladder (hyperuricosuria). Uric acid is a chemical that the body produces when you eat certain foods. It usually exits the body in the urine.  Narrowing (stricture) of one or both of the ureters.  A kidney blockage that is present at birth (congenital obstruction).  Past surgery on the kidney or the ureters, such as gastric bypass surgery.    What increases the risk?  The following factors may make you more likely to develop this condition:    Having had a kidney stone in the past.  Having a family history of kidney stones.  Not drinking enough water.  Eating a diet that is high in protein, salt (sodium), or sugar.  Being overweight or obese.    What are the signs or symptoms?  Symptoms of a kidney stone may include:    Pain in the side of the abdomen, right below the ribs (flank pain). Pain usually spreads (radiates) to the groin.  Needing to urinate frequently or urgently.  Painful urination.  Blood in the urine (hematuria).  Nausea.  Vomiting.  Fever and chills.    How is this diagnosed?  This condition may be diagnosed based on:    Your symptoms and medical history.  A physical exam.  Blood tests.  Urine tests. These may be done before and after the stone passes out of your body through urination.  Imaging tests, such as a CT scan, abdominal X-ray, or ultrasound.  A procedure to examine the inside of the bladder (cystoscopy).    How is this treated?  Treatment for kidney stones depends on the size, location, and makeup of the stones. Kidney stones will often pass out of the body through urination. You may need to:    Increase your fluid intake to help pass the stone. In some cases, you may be given fluids through an IV and may need to be monitored at the hospital.  Take medicine for pain.  Make changes in your diet to help prevent kidney stones from coming back.    Sometimes, medical procedures are needed to remove a kidney stone. This may involve:    A procedure to break up kidney stones using:    A focused beam of light (laser therapy).  Shock waves (extracorporeal shock wave lithotripsy).  Surgery to remove kidney stones. This may be needed if you have severe pain or have stones that block your urinary tract.    Follow these instructions at home:      Medicines    Take over-the-counter and prescription medicines only as told by your health care provider.  Ask your health care provider if the medicine prescribed to you requires you to avoid driving or using heavy machinery.        Eating and drinking    Drink enough fluid to keep your urine pale yellow. You may be instructed to drink at least 8–10 glasses of water each day. This will help you pass the kidney stone.  If directed, change your diet. This may include:    Limiting how much sodium you eat.  Eating more fruits and vegetables.  Limiting how much animal protein—such as red meat, poultry, fish, and eggs—you eat.  Follow instructions from your health care provider about eating or drinking restrictions.        General instructions    Collect urine samples as told by your health care provider. You may need to collect a urine sample:    24 hours after you pass the stone.  8–12 weeks after passing the kidney stone, and every 6–12 months after that.  Strain your urine every time you urinate, for as long as directed. Use the strainer that your health care provider recommends.  Do not throw out the kidney stone after passing it. Keep the stone so it can be tested by your health care provider. Testing the makeup of your kidney stone may help prevent you from getting kidney stones in the future.  Keep all follow-up visits as told by your health care provider. This is important. You may need follow-up X-rays or ultrasounds to make sure that your stone has passed.    How is this prevented?     To prevent another kidney stone:    Drink enough fluid to keep your urine pale yellow. This is the best way to prevent kidney stones.  Eat a healthy diet and follow recommendations from your health care provider about foods to avoid. You may be instructed to eat a low-protein diet. Recommendations vary depending on the type of kidney stone that you have.  Maintain a healthy weight.    Where to find more information  [employer] (NKF): www.kidney.org  [employer] ([facility]): www.urologyhealth.org    Contact a health care provider if:  You have pain that gets worse or does not get better with medicine.    Get help right away if:  You have a fever or chills.  You develop severe pain.  You develop new abdominal pain.  You faint.  You are unable to urinate.    Summary  Kidney stones are solid, rock-like deposits that form inside of the kidneys.  Kidney stones can cause nausea, vomiting, blood in the urine, abdominal pain, and the urge to urinate frequently.  Treatment for kidney stones depends on the size, location, and makeup of the stones. Kidney stones will often pass out of the body through urination.  Kidney stones can be prevented by drinking enough fluids, eating a healthy diet, and maintaining a healthy weight.    ADDITIONAL NOTES AND INSTRUCTIONS    Please follow up with your Primary MD in 24-48 hr.  Seek immediate medical care for any new/worsening signs or symptoms.     Document Released: 12/18/2006 Document Revised: 5/5/2020 Document Reviewed: 5/5/2020  [employer] Interactive Patient Education ©2019 [employer] This information is not intended to replace advice given to you by your health care provider. Make sure you discuss any questions you have with your health care provider.

## 2024-09-23 NOTE — ED PROVIDER NOTE - PHYSICAL EXAMINATION
General: Awake, alert, lying in bed in NAD  HEENT: Normocephalic, atraumatic. No scleral icterus or conjunctival injection. EOMI. Moist mucous membranes. Oropharynx clear.   Neck:. Soft and supple.  Cardiac: Tachy but regular. Peripheral pulses 2+ and symmetric. No LE edema.  Resp: Lungs CTAB. No accessory muscle use  Abd: Soft, non-tender, non-distended. No guarding, rebound, or rigidity.  Back: Spine midline and non-tender.   Skin: No rashes, abrasions, or lacerations.  Neuro: AO x 4. Moves all extremities symmetrically. Motor strength and sensation grossly intact.  Psych: Appropriate mood and affect

## 2024-09-23 NOTE — CONSULT NOTE ADULT - SUBJECTIVE AND OBJECTIVE BOX
HPI: 54F hx renal stones, BRCA in remission presents with left flank pain with associated nausea and fever (Tmax 103.2F earlier tonight). Pt and  at bedside state 2 days ago pt had an episode of hematuria, subsequently developed flank pain fever and nausea. Denies CP, SOB, vomiting, abd pain. States pain is dull and dissimilar to her renal stone pain.    UROLOGY CONSULT: Called for patient that came in with hematuria 3 days ago, flank pain, nausea no vomiting. Patient states she was febrile at home to 102. States she has a PMH of stones but this pain was different and more severe, not relieved by medication at home, which prompted her to come in. Patient states hematuria has resolved, it was a one time episode on Friday. Denies chills, shortness of breath, vomit, abd pain.       Vital Signs Last 24 Hrs  T(C): 37.1 (23 Sep 2024 02:55), Max: 37.3 (23 Sep 2024 00:06)  T(F): 98.7 (23 Sep 2024 02:55), Max: 99.2 (23 Sep 2024 00:06)  HR: 104 (23 Sep 2024 02:55) (104 - 136)  BP: 108/73 (23 Sep 2024 02:55) (108/73 - 142/94)  BP(mean): --  RR: 19 (23 Sep 2024 02:55) (18 - 19)  SpO2: 96% (23 Sep 2024 02:55) (96% - 96%)    Parameters below as of 23 Sep 2024 02:55  Patient On (Oxygen Delivery Method): room air      < from: CT Abdomen and Pelvis w/ IV Cont (09.23.24 @ 04:08) >  IMPRESSION:  3 mm calculus in the proximal left ureter with mild left hydronephrosis   and mild left perinephric fat stranding.    Patchy consolidation and small adjacent groundglass in the right lower   lobe concerning for pneumonia in the appropriate clinical setting.        --- End of Report ---        EVON SAMPSON MD; AttendingRadiologist  This document has been electronically signed. Sep 23 2024  4:28AM    < end of copied text >      Complete Blood Count + Automated Diff (09.23.24 @ 01:30)    WBC Count: 7.94 K/uL   RBC Count: 4.34 M/uL   Hemoglobin: 13.6 g/dL   Hematocrit: 39.9 %   Mean Cell Volume: 91.9 fl   Mean Cell Hemoglobin: 31.3 pg   Mean Cell Hemoglobin Conc: 34.1 gm/dL   Red Cell Distrib Width: 12.8 %   Platelet Count - Automated: 231 K/uL   Auto Neutrophil #: 6.76 K/uL   Auto Lymphocyte #: 0.73 K/uL   Auto Monocyte #: 0.39 K/uL   Auto Eosinophil #: 0.01 K/uL   Auto Basophil #: 0.02 K/uL   Auto Neutrophil %: 85.1: Differential percentages must be correlated with absolute numbers for  clinical significance. %   Auto Lymphocyte %: 9.2 %   Auto Monocyte %: 4.9 %   Auto Eosinophil %: 0.1 %   Auto Basophil %: 0.3 %   Auto Immature Granulocyte %: 0.4: (Includes meta, myelo and promyelocytes). Mild elevations in immature  granulocytes may be seen with many inflammatory processes and pregnancy;  clinical correlation suggested. %    Comprehensive Metabolic Panel (09.23.24 @ 01:30)    Sodium: 136 mmol/L   Potassium: 4.1 mmol/L   Chloride: 103: Chloride reference range changed from ..10/26/2022 mmol/L   Carbon Dioxide: 19.0 mmol/L   Anion Gap: 14 mmol/L   Blood Urea Nitrogen: 11.0 mg/dL   Creatinine: 0.69 mg/dL   Glucose: 129 mg/dL   Calcium: 8.8 mg/dL   Protein Total: 6.9 g/dL   Albumin: 3.9 g/dL   Bilirubin Total: 0.4 mg/dL   Alkaline Phosphatase: 87 U/L   Aspartate Aminotransferase (AST/SGOT): 40 U/L   Alanine Aminotransferase (ALT/SGPT): 39 U/L   eGFR: 102: The estimated glomerular filtration rate (eGFR) calculation is based on  the 2021 CKD-EPI creatinine equation, which is validated in male and  female population 18 years of age and older (N Engl J Med 2021;  [id number]). mL/min/1.73m2

## 2024-09-23 NOTE — ED ADULT NURSE NOTE - OBJECTIVE STATEMENT
pt comes into ED A&Ox4, c/o L flank pain and states "my brain feels swollen". pt stated hematuria Fri/Sat, but has since subsided. hx of breast ca, no other medical hx. pt breathing even and unlabored on room air. no other complaints of pain or discomfort at this time.

## 2024-09-23 NOTE — BRIEF OPERATIVE NOTE - NSICDXBRIEFPOSTOP_GEN_ALL_CORE_FT
POST-OP DIAGNOSIS:  Acute UTI 23-Sep-2024 19:08:58  Cedric Paiz  Ureteral stone 23-Sep-2024 19:09:05  Cedric Paiz

## 2024-09-23 NOTE — ED ADULT NURSE REASSESSMENT NOTE - NS ED NURSE REASSESS COMMENT FT1
A 2 RN skin check has been performed on admission to CDU6R with RN witness Kareem.  A pressure injury NOT identified.  Documentation in the assessment to support findings.
pt breathing even and unlabored on room air. NAD. pt updated on POC.
Assumed care of pt at 07:15 as stated in report from CHIOMA Harper. Charting as noted. Patient A&O x4, denies pain/discomfort, denies CP/SOB. Updated on the plan of care. Call bell within reach, bed locked in lowest position. IV site flushed w/ NS. No redness, swelling or pain noted to site. No signs of acute distress noted, safety maintained. Pt remains on CM in NSR.

## 2024-09-23 NOTE — ED PROVIDER NOTE - CLINICAL SUMMARY MEDICAL DECISION MAKING FREE TEXT BOX
54F presents with flank pain, fever, nausea. Found to be tachy, febrile meeting SIRS criteria, will eval for sepsis. CT A/P given hx renal stones for possible infected stone. Abx, IVF, sx control, reassess 54F presents with flank pain, fever, nausea. Found to be tachy, febrile meeting SIRS criteria, will eval for sepsis. CT A/P given hx renal stones for possible infected stone. Abx, IVF, sx control, reassess.    Feeling significantly better with complete resolution of pain.  Patient amatory without symptoms no further nausea or vomiting.  Patient no respiratory symptoms however given CAT scan outpatient to be more tentative if she develops any pulmonary symptoms.  Patient stable for outpatient follow-up with Dr. Paiz/kidney clinic.  Will contact Joan cook to arrange for outpatient follow-up 54F presents with flank pain, fever, nausea. Found to be tachy, febrile meeting SIRS criteria, will eval for sepsis. CT A/P given hx renal stones for possible infected stone. Abx, IVF, sx control, reassess.    Feeling significantly better with complete resolution of pain.  Patient amatory without symptoms no further nausea or vomiting.  Patient no respiratory symptoms however given CAT scan outpatient to be more tentative if she develops any pulmonary symptoms.  Patient stable for outpatient follow-up with Dr. Paiz/kidney clinic.  Will contact Joan cook to arrange for outpatient follow-up    Urology PA reevaluate the patient and discussed case with Dr. Paiz and they feel patient would be better served to be admitted for further management of her condition.

## 2024-09-23 NOTE — BRIEF OPERATIVE NOTE - NSICDXBRIEFPREOP_GEN_ALL_CORE_FT
PRE-OP DIAGNOSIS:  Acute UTI 23-Sep-2024 19:08:43  Cedric Paiz  Ureteral stone 23-Sep-2024 19:08:50  Cedric Paiz

## 2024-09-23 NOTE — PATIENT PROFILE ADULT - FUNCTIONAL ASSESSMENT - BASIC MOBILITY SCORE.
Initial Anesthesia Post-op Note    Patient: Zahra Cuba  Procedure(s) Performed: LAPAROSCOPIC ASSISTED VAGINAL HYSTERECTOMY BILATERAL SALPINGECTOMYCYSTOSCOPY  Anesthesia type: General    Vitals Value Taken Time   Temp 36.4 °C (97.6 °F) 05/23/22 1310   Pulse 106 05/23/22 1310   Resp 18 05/23/22 1310   SpO2 99 % 05/23/22 1310   /78 05/23/22 1310         Patient Location: PACU Phase 1  Post-op Vital Signs:stable  Level of Consciousness: awake and alert  Respiratory Status: spontaneous ventilation  Cardiovascular stable  Hydration: euvolemic  Pain Management: adequately controlled  Handoff: Handoff to receiving nurse was performed and questions were answered  Vomiting: none  Nausea: None  Airway Patency:patent  Post-op Assessment: no complications and patient tolerated procedure well with no complications      No complications documented.  
24

## 2024-09-23 NOTE — ED ADULT TRIAGE NOTE - CHIEF COMPLAINT QUOTE
pt c/o left flank pain and head pain/pressure.  Pt states hematuria Friday and Saturday, seen by PCP on Saturday, prescribed abx but has not picked up meds.  Currently pain 8/10.  Hx of kidney, breast Ca w/double mastectomy and partial hysterectomy.  Took 2 alieve 4 hrs ago.

## 2024-09-23 NOTE — H&P ADULT - NSHPPHYSICALEXAM_GEN_ALL_CORE
PHYSICAL EXAM:      Constitutional: A&O x 4    Eyes:    ENMT:    Neck: supple    Breasts:    Back: no cvat b/l    Respiratory: resp nonlabored, mild cough    Cardiovascular:    Gastrointestinal: mild lower abdominal pain, no rebound tenderness    Genitourinary: voiding    Rectal:    Extremities:    Vascular:    Neurological:    Skin: warm, dry    Lymph Nodes:    Musculoskeletal:    Psychiatric:

## 2024-09-23 NOTE — CONSULT NOTE ADULT - ASSESSMENT
A: Patient is a 53 yo female with PMH of kidney stones, found to have a 3 mm stone proximal to left ureter with mild left hydronephrosis and stranding. Patient was febrile at home, on admission hasn't had a fever. WBC 7.9, Cr .69, UA+. Patient's latest vitals were 98.7F, 104, 108/73. Patient states pain has been better controlled.     Plan to be discussed with Dr Paiz   - trial PO pain medication   - Diet, monitor tolerance and nausea   - Monitor vitals for fever   - IV fluids   - Flomax   - c/w antibiotics   - if patient's pain is controlled with po meds, nausea resolved and vitals stable, may be d/c with f/u outpatient with Dr Paiz (patient has a urologist but would like to switch so please add Dr Paiz's info on discharge)   - will f/u patients pain/ vitals for worsening symptoms of patient doesn't tolerate po trial  A: Patient is a 53 yo female with PMH of kidney stones, found to have a 3 mm stone proximal to left ureter with mild left hydronephrosis and stranding. Patient was febrile at home, on admission hasn't had a fever. WBC 7.9, Cr .69, UA+. Patient's latest vitals were 98.7F, 104, 108/73. Patient states pain has been better controlled. Patient's CT also states "Patchy consolidation and small adjacent groundglass in the right lower lobe concerning for pneumonia in the appropriate clinical setting." to be treated appropriately     Plan to be discussed with Dr Paiz   - trial PO pain medication   - Diet, monitor tolerance and nausea   - Monitor vitals for fever   - IV fluids   - Flomax   - c/w antibiotics   - if patient's pain is controlled with po meds, nausea resolved and vitals stable, may be d/c with f/u outpatient with Dr Paiz (patient has a urologist but would like to switch so please add Dr Paiz's info on discharge)   - will f/u patients pain/ vitals for worsening symptoms of patient doesn't tolerate po trial

## 2024-09-23 NOTE — ASU PREOP CHECKLIST - HAND OFF
Holding RN to OR RN Burow's Graft Text: The defect edges were debeveled with a #15 scalpel blade.  Given the location of the defect, shape of the defect, the proximity to free margins and the presence of a standing cone deformity a Burow's skin graft was deemed most appropriate. The standing cone was removed and this tissue was then trimmed to the shape of the primary defect. The adipose tissue was also removed until only dermis and epidermis were left.  The skin margins of the secondary defect were undermined to an appropriate distance in all directions utilizing iris scissors.  The secondary defect was closed with interrupted buried subcutaneous sutures.  The skin edges were then re-apposed with running  sutures.  The skin graft was then placed in the primary defect and oriented appropriately.

## 2024-09-23 NOTE — ED PROVIDER NOTE - PATIENT PORTAL LINK FT
You can access the FollowMyHealth Patient Portal offered by [facility] by registering at the following website: [email or web address]. By joining [email or web address]’s FollowMyHealth portal, you will also be able to view your health information using other applications (apps) compatible with our system.

## 2024-09-23 NOTE — H&P ADULT - NS ATTEND AMEND GEN_ALL_CORE FT
Patient septic and in need of ureteral stent placement.  I have reviewed the need for a staged approach to the stoe and that the removal of the stone would not be done until infection cleared.    Risks, benefits and alternatives explained.

## 2024-09-23 NOTE — H&P ADULT - HISTORY OF PRESENT ILLNESS
55 yo female presented to the ED with left flank pain, nausea, fever at home 103, mild hematuria a few days ago.  pt also states she thinks she's getting a cold.  Pt has a h/o renal calculi, needed a stent a few years ago, does not follow up with any urologist, would like to follow with Charles alberts. Pt was treated with IVF, pain meds and flomax in ED, feels better, nontoxic appearing.  pt has a distal left ureteral stone, mild hydronephrosis, perinephric stranding.  Pt to be admitted for IVabx and plan for a ureteral stent today.

## 2024-09-23 NOTE — ED PROVIDER NOTE - OBJECTIVE STATEMENT
54F hx renal stones, BRCA in remission presents with left flank pain with associated nausea and fever (Tmax 103.2F earlier tonight). Pt and  at bedside state 2 days ago pt had an episode of hematuria, subsequently developed flank pain fever and nausea. Denies CP, SOB, vomiting, abd pain. States pain is dull and dissimilar to her renal stone pain.

## 2024-09-24 LAB
-  COAGULASE NEGATIVE STAPHYLOCOCCUS: SIGNIFICANT CHANGE UP
ANION GAP SERPL CALC-SCNC: 13 MMOL/L — SIGNIFICANT CHANGE UP (ref 5–17)
BUN SERPL-MCNC: 6.3 MG/DL — LOW (ref 8–20)
CALCIUM SERPL-MCNC: 8.7 MG/DL — SIGNIFICANT CHANGE UP (ref 8.4–10.5)
CHLORIDE SERPL-SCNC: 107 MMOL/L — SIGNIFICANT CHANGE UP (ref 96–108)
CO2 SERPL-SCNC: 20 MMOL/L — LOW (ref 22–29)
CREAT SERPL-MCNC: 0.48 MG/DL — LOW (ref 0.5–1.3)
CULTURE RESULTS: SIGNIFICANT CHANGE UP
EGFR: 111 ML/MIN/1.73M2 — SIGNIFICANT CHANGE UP
GLUCOSE SERPL-MCNC: 120 MG/DL — HIGH (ref 70–99)
GRAM STN FLD: ABNORMAL
GRAM STN FLD: ABNORMAL
HCT VFR BLD CALC: 36.6 % — SIGNIFICANT CHANGE UP (ref 34.5–45)
HGB BLD-MCNC: 12.1 G/DL — SIGNIFICANT CHANGE UP (ref 11.5–15.5)
MCHC RBC-ENTMCNC: 30.9 PG — SIGNIFICANT CHANGE UP (ref 27–34)
MCHC RBC-ENTMCNC: 33.1 GM/DL — SIGNIFICANT CHANGE UP (ref 32–36)
MCV RBC AUTO: 93.6 FL — SIGNIFICANT CHANGE UP (ref 80–100)
METHOD TYPE: SIGNIFICANT CHANGE UP
PLATELET # BLD AUTO: 175 K/UL — SIGNIFICANT CHANGE UP (ref 150–400)
POTASSIUM SERPL-MCNC: 3.6 MMOL/L — SIGNIFICANT CHANGE UP (ref 3.5–5.3)
POTASSIUM SERPL-SCNC: 3.6 MMOL/L — SIGNIFICANT CHANGE UP (ref 3.5–5.3)
RAPID RVP RESULT: SIGNIFICANT CHANGE UP
RBC # BLD: 3.91 M/UL — SIGNIFICANT CHANGE UP (ref 3.8–5.2)
RBC # FLD: 13.2 % — SIGNIFICANT CHANGE UP (ref 10.3–14.5)
SARS-COV-2 RNA SPEC QL NAA+PROBE: SIGNIFICANT CHANGE UP
SODIUM SERPL-SCNC: 139 MMOL/L — SIGNIFICANT CHANGE UP (ref 135–145)
SPECIMEN SOURCE: SIGNIFICANT CHANGE UP
SPECIMEN SOURCE: SIGNIFICANT CHANGE UP
WBC # BLD: 5.84 K/UL — SIGNIFICANT CHANGE UP (ref 3.8–10.5)
WBC # FLD AUTO: 5.84 K/UL — SIGNIFICANT CHANGE UP (ref 3.8–10.5)

## 2024-09-24 PROCEDURE — 99223 1ST HOSP IP/OBS HIGH 75: CPT

## 2024-09-24 RX ORDER — CEFTRIAXONE SODIUM 1 G
2000 VIAL (EA) INJECTION EVERY 24 HOURS
Refills: 0 | Status: DISCONTINUED | OUTPATIENT
Start: 2024-09-25 | End: 2024-09-26

## 2024-09-24 RX ORDER — CEFTRIAXONE SODIUM 1 G
2000 VIAL (EA) INJECTION ONCE
Refills: 0 | Status: COMPLETED | OUTPATIENT
Start: 2024-09-24 | End: 2024-09-24

## 2024-09-24 RX ORDER — CEFTRIAXONE SODIUM 1 G
VIAL (EA) INJECTION
Refills: 0 | Status: DISCONTINUED | OUTPATIENT
Start: 2024-09-24 | End: 2024-09-26

## 2024-09-24 RX ADMIN — KETOROLAC TROMETHAMINE 15 MILLIGRAM(S): 10 TABLET, FILM COATED ORAL at 17:38

## 2024-09-24 RX ADMIN — PIPERACILLIN SODIUM AND TAZOBACTAM SODIUM 25 GRAM(S): 12; 1.5 INJECTION, POWDER, LYOPHILIZED, FOR SOLUTION INTRAVENOUS at 13:08

## 2024-09-24 RX ADMIN — KETOROLAC TROMETHAMINE 15 MILLIGRAM(S): 10 TABLET, FILM COATED ORAL at 06:10

## 2024-09-24 RX ADMIN — Medication 975 MILLIGRAM(S): at 00:30

## 2024-09-24 RX ADMIN — Medication 975 MILLIGRAM(S): at 12:30

## 2024-09-24 RX ADMIN — IPRATROPIUM BROMIDE AND ALBUTEROL SULFATE 3 MILLILITER(S): .5; 3 SOLUTION RESPIRATORY (INHALATION) at 08:51

## 2024-09-24 RX ADMIN — IPRATROPIUM BROMIDE AND ALBUTEROL SULFATE 3 MILLILITER(S): .5; 3 SOLUTION RESPIRATORY (INHALATION) at 00:13

## 2024-09-24 RX ADMIN — KETOROLAC TROMETHAMINE 15 MILLIGRAM(S): 10 TABLET, FILM COATED ORAL at 06:36

## 2024-09-24 RX ADMIN — GUAIFENESIN 100 MILLIGRAM(S): 100 SOLUTION ORAL at 00:30

## 2024-09-24 RX ADMIN — GUAIFENESIN 100 MILLIGRAM(S): 100 SOLUTION ORAL at 13:59

## 2024-09-24 RX ADMIN — Medication 2000 MILLIGRAM(S): at 21:37

## 2024-09-24 RX ADMIN — Medication 975 MILLIGRAM(S): at 01:10

## 2024-09-24 RX ADMIN — Medication 975 MILLIGRAM(S): at 17:38

## 2024-09-24 RX ADMIN — Medication 975 MILLIGRAM(S): at 18:38

## 2024-09-24 RX ADMIN — KETOROLAC TROMETHAMINE 15 MILLIGRAM(S): 10 TABLET, FILM COATED ORAL at 18:38

## 2024-09-24 RX ADMIN — KETOROLAC TROMETHAMINE 15 MILLIGRAM(S): 10 TABLET, FILM COATED ORAL at 12:30

## 2024-09-24 RX ADMIN — KETOROLAC TROMETHAMINE 15 MILLIGRAM(S): 10 TABLET, FILM COATED ORAL at 13:30

## 2024-09-24 RX ADMIN — Medication 975 MILLIGRAM(S): at 06:10

## 2024-09-24 RX ADMIN — IPRATROPIUM BROMIDE AND ALBUTEROL SULFATE 3 MILLILITER(S): .5; 3 SOLUTION RESPIRATORY (INHALATION) at 21:45

## 2024-09-24 RX ADMIN — KETOROLAC TROMETHAMINE 15 MILLIGRAM(S): 10 TABLET, FILM COATED ORAL at 23:41

## 2024-09-24 RX ADMIN — Medication 975 MILLIGRAM(S): at 06:35

## 2024-09-24 RX ADMIN — PIPERACILLIN SODIUM AND TAZOBACTAM SODIUM 25 GRAM(S): 12; 1.5 INJECTION, POWDER, LYOPHILIZED, FOR SOLUTION INTRAVENOUS at 06:10

## 2024-09-24 RX ADMIN — KETOROLAC TROMETHAMINE 15 MILLIGRAM(S): 10 TABLET, FILM COATED ORAL at 01:10

## 2024-09-24 RX ADMIN — Medication 975 MILLIGRAM(S): at 23:41

## 2024-09-24 RX ADMIN — KETOROLAC TROMETHAMINE 15 MILLIGRAM(S): 10 TABLET, FILM COATED ORAL at 00:30

## 2024-09-24 RX ADMIN — Medication 5 MILLIGRAM(S): at 17:38

## 2024-09-24 RX ADMIN — IPRATROPIUM BROMIDE AND ALBUTEROL SULFATE 3 MILLILITER(S): .5; 3 SOLUTION RESPIRATORY (INHALATION) at 16:06

## 2024-09-24 RX ADMIN — Medication 975 MILLIGRAM(S): at 13:30

## 2024-09-24 NOTE — CONSULT NOTE ADULT - SUBJECTIVE AND OBJECTIVE BOX
[facility]  INFECTIOUS DISEASES at [place] and [place]  =====================================================         Fredis Soliz MD                                                        Diplomates American Board of Internal Medicine & Infectious Diseases                * [place] Office - Appt - Tel  [phone] Fax [phone]                * [place] Office - Appt - Tel [phone] Fax [phone]                                  Hospital Consult line:  [phone]  =====================================================      [id number]  ELIS SHOOK        CC: Patient is a 54y old  Female who presents with a chief complaint of septic stone (24 Sep 2024 07:54)      54y  Female     HPI:  53 yo female presented to the ED with left flank pain, nausea, fever at home 103, mild hematuria a few days ago.  pt also states she thinks she's getting a cold.  Pt has a h/o renal calculi, needed a stent a few years ago, does not follow up with any urologist, would like to follow with [facility]. Pt was treated with IVF, pain meds and flomax in ED, feels better, nontoxic appearing.  pt has a distal left ureteral stone, mild hydronephrosis, perinephric stranding.  Pt to be admitted for IVabx and plan for a ureteral stent today. (23 Sep 2024 08:20)    ______________________________________________________  PAST MEDICAL & SURGICAL HISTORY:  Renal calculi      History of removal of ureteral stent          Social History:    Occupation:  Travel:  Pets:  Drugs:  Alcohol:     FAMILY HISTORY:  No pertinent family history in first degree relatives        ______________________________________________________  Allergies    No Known Allergies    Intolerances        ______________________________________________________  MEDICATIONS:  Antibiotics:  piperacillin/tazobactam IVPB.. 3.375 Gram(s) IV Intermittent every 8 hours    Other medications:  acetaminophen     Tablet .. 975 milliGRAM(s) Oral every 6 hours  albuterol/ipratropium for Nebulization 3 milliLiter(s) Nebulizer every 6 hours  influenza   Vaccine 0.5 milliLiter(s) IntraMuscular once  ketorolac   Injectable 15 milliGRAM(s) IV Push every 6 hours    ______________________________________________________  REVIEW OF SYSTEMS:  CONSTITUTIONAL:  No fever or chills  HEENT:  No diplopia or blurred vision.  No earache, sore throat or runny nose.  CARDIOVASCULAR:  No chest pain  RESPIRATORY:  No cough, shortness of breath  GASTROINTESTINAL:  No nausea, vomiting, abdominal pain or diarrhea.  GENITOURINARY:  No dysuria, frequency or urgency. No blood in urine  MUSCULOSKELETAL:  no joint aches, no muscle pain  SKIN:  No change in skin, hair or nails.  NEUROLOGIC:  No headaches, seizures  PSYCHIATRIC:  No disorder of thought or mood.  ENDOCRINE:  No heat or cold intolerance  HEMATOLOGICAL:  No easy bruising or bleeding.     _____________________________________________________  PHYSICAL EXAM:  GEN: AAOx4. Lying comfortable in bed, in no acute distress   HEENT: normocephalic and atraumatic. EOMI. PERRL.  Anicteric sclerae. Moist mucous membranes. No mucosal lesions. No nasal discharge.   NECK: Supple. No palpable neck masses or LN  LUNGS: eupneic, CTA B/L, no adventitious sounds  HEART: RRR, no m/r/g  ABDOMEN: Soft, NT, ND, no hepatosplenomegally, no palpable masses.  +BS.    : No CVA tenderness, no Isbell catheter  EXTREMITIES: well perfused, without  edema.  MSK: No joint deformity or swelling  LYMPH: no palpable cervical, supraclavicular, axillary or inguinal lymph nodes  NEUROLOGIC: Grossly no motor focal deficits   PSYCHIATRIC: Appropriate affect and mood.  SKIN: No rash, wounds or jaundice  LINES: PIV, no phlebitis     ______________________________________________________  Height (cm): 162.6 (09-23 @ 17:58)  Weight (kg): 79.9 (09-23 @ 17:58)  BMI (kg/m2): 30.2 (09-23 @ 17:58)  BSA (m2): 1.85 (09-23 @ 17:58)    Vitals:  T(F): 97.8 (24 Sep 2024 10:00), Max: 100 (23 Sep 2024 15:42)  HR: 92 (24 Sep 2024 10:00)  BP: 102/65 (24 Sep 2024 10:00)  RR: 18 (24 Sep 2024 10:00)  SpO2: 95% (24 Sep 2024 10:00) (93% - 98%)  temp max in last 48H T(F): , Max: 100 (09-23-24 @ 15:42)    Current Antibiotics:  piperacillin/tazobactam IVPB.. 3.375 Gram(s) IV Intermittent every 8 hours    Other medications:  acetaminophen     Tablet .. 975 milliGRAM(s) Oral every 6 hours  albuterol/ipratropium for Nebulization 3 milliLiter(s) Nebulizer every 6 hours  influenza   Vaccine 0.5 milliLiter(s) IntraMuscular once  ketorolac   Injectable 15 milliGRAM(s) IV Push every 6 hours                            12.1   5.84  )-----------( 175      ( 24 Sep 2024 06:32 )             36.6     09-24    139  |  107  |  6.3[L]  ----------------------------<  120[H]  3.6   |  20.0[L]  |  0.48[L]    Ca    8.7      24 Sep 2024 06:32    TPro  6.9  /  Alb  3.9  /  TBili  0.4  /  DBili  x   /  AST  40[H]  /  ALT  39[H]  /  AlkPhos  87  09-23    RECENT CULTURES:  09-23 @ 03:05 Clean Catch Clean Catch (Midstream)     <10,000 CFU/mL Normal Urogenital Erin        09-23 @ 01:30 .Blood Blood-Peripheral Blood Culture PCR    Growth in aerobic bottle: Gram Positive Cocci in Clusters  Growth in anaerobic bottle: Gram Positive Cocci in Clusters  Direct identification is available within approximately 3-5  hours either by Blood Panel Multiplexed PCR or Direct  MALDI-TOF. Details: [email or web address]    Growth in aerobic bottle: Gram Positive Cocci in Clusters  Growth in anaerobic bottle: Gram Positive Cocci in Clusters          WBC Count: 5.84 K/uL (09-24-24 @ 06:32)  WBC Count: 7.94 K/uL (09-23-24 @ 01:30)    Creatinine: 0.48 mg/dL (09-24-24 @ 06:32)  Creatinine: 0.69 mg/dL (09-23-24 @ 01:30)                 ______________________________________________________  CARDIOLOGY    ______________________________________________________  RADIOLOGY [facility]  INFECTIOUS DISEASES at [place] and [place]  =====================================================         Fredis Soliz MD                                                        Diplomates American Board of Internal Medicine & Infectious Diseases                * [place] Office - Appt - Tel  [phone] Fax [phone]                * [place] Office - Appt - Tel [phone] Fax [phone]                                  Hospital Consult line:  [phone]  =====================================================      [id number]  ELIS SHOOK        CC: Patient is a 54y old  Female who presents with a chief complaint of septic stone (24 Sep 2024 07:54)    HPI: 55 yo female presented to the ED with left flank pain, nausea, fever at home 103, mild hematuria a few days ago.  pt also states she thinks she's getting a cold.  Pt has a h/o renal calculi, needed a stent a few years ago, does not follow up with any urologist, would like to follow with [facility]. Pt was treated with IVF, pain meds and flomax in ED, feels better, nontoxic appearing.  pt has a distal left ureteral stone, mild hydronephrosis, perinephric stranding.  Pt to be admitted for IVabx and plan for a ureteral stent today. (23 Sep 2024 08:20)      On my exam -   +dry cough, chest fullness. Had an episode of epitaxis and mild sore throat   +dysuria  No flank pain, nausea, vomiting or diarrhea  Feels exhausted   ______________________________________________________  PAST MEDICAL & SURGICAL HISTORY:  Renal calculi    History of removal of ureteral stent      Social History:  No drug or alcohol abuse   Works as a      FAMILY HISTORY:  No pertinent family history in first degree relatives      ______________________________________________________  Allergies    No Known Allergies    Intolerances      ______________________________________________________  REVIEW OF SYSTEMS:  CONSTITUTIONAL: as per HPI   HEENT:  as per HPI   CARDIOVASCULAR:  No chest pain  RESPIRATORY: as per HPI   GASTROINTESTINAL:  No nausea, vomiting, abdominal pain or diarrhea.  GENITOURINARY: as per HPI   MUSCULOSKELETAL:  no joint aches, no muscle pain  SKIN:  No change in skin, hair or nails.  NEUROLOGIC:  No headaches, seizures  PSYCHIATRIC:  No disorder of thought or mood.  ENDOCRINE:  No heat or cold intolerance  HEMATOLOGICAL:  No easy bruising or bleeding.     _____________________________________________________  PHYSICAL EXAM:  GEN: AAOx4. sitting in reclining chair in NAD   HEENT: normocephalic and atraumatic. Anicteric sclerae. Moist mucous membranes. No mucosal lesions. No nasal discharge.   NECK: Supple. No palpable neck masses or LN  LUNGS: eupneic, CTA B/L, no adventitious sounds. +dry cough   HEART: RRR, no m/r/g  ABDOMEN: Soft, NT, ND,  no palpable masses.  +BS.    : No CVA tenderness, no Isbell catheter  EXTREMITIES: without  edema.  MSK: No joint deformity or swelling  NEUROLOGIC: Grossly no motor focal deficits   PSYCHIATRIC: Appropriate affect and mood.  SKIN: No rash, wounds or jaundice  LINES: PIV, no phlebitis     ______________________________________________________  Height (cm): 162.6 (09-23 @ 17:58)  Weight (kg): 79.9 (09-23 @ 17:58)  BMI (kg/m2): 30.2 (09-23 @ 17:58)  BSA (m2): 1.85 (09-23 @ 17:58)    Vitals:  T(F): 97.8 (24 Sep 2024 10:00), Max: 100 (23 Sep 2024 15:42)  HR: 92 (24 Sep 2024 10:00)  BP: 102/65 (24 Sep 2024 10:00)  RR: 18 (24 Sep 2024 10:00)  SpO2: 95% (24 Sep 2024 10:00) (93% - 98%)  temp max in last 48H T(F): , Max: 100 (09-23-24 @ 15:42)    Current Antibiotics:  piperacillin/tazobactam IVPB.. 3.375 Gram(s) IV Intermittent every 8 hours    Other medications:  acetaminophen     Tablet .. 975 milliGRAM(s) Oral every 6 hours  albuterol/ipratropium for Nebulization 3 milliLiter(s) Nebulizer every 6 hours  influenza   Vaccine 0.5 milliLiter(s) IntraMuscular once  ketorolac   Injectable 15 milliGRAM(s) IV Push every 6 hours                            12.1   5.84  )-----------( 175      ( 24 Sep 2024 06:32 )             36.6     09-24    139  |  107  |  6.3[L]  ----------------------------<  120[H]  3.6   |  20.0[L]  |  0.48[L]    Ca    8.7      24 Sep 2024 06:32    TPro  6.9  /  Alb  3.9  /  TBili  0.4  /  DBili  x   /  AST  40[H]  /  ALT  39[H]  /  AlkPhos  87  09-23    RECENT CULTURES:  09-23 @ 03:05 Clean Catch Clean Catch (Midstream)     <10,000 CFU/mL Normal Urogenital Erin        09-23 @ 01:30 .Blood Blood-Peripheral Blood Culture PCR    CONS     Growth in aerobic bottle: Gram Positive Cocci in Clusters  Growth in anaerobic bottle: Gram Positive Cocci in Clusters      WBC Count: 5.84 K/uL (09-24-24 @ 06:32)  WBC Count: 7.94 K/uL (09-23-24 @ 01:30)    Creatinine: 0.48 mg/dL (09-24-24 @ 06:32)  Creatinine: 0.69 mg/dL (09-23-24 @ 01:30)    ______________________________________________________  CARDIOLOGY    ______________________________________________________  RADIOLOGY  < from: CT Abdomen and Pelvis w/ IV Cont (09.23.24 @ 04:08) >  FINDINGS:  LOWER CHEST: Patchy consolidation and small adjacent groundglass in the   right lower lobe concerning for pneumonia in the appropriate clinical   setting.    LIVER: Within normal limits.  BILE DUCTS: Normal caliber.  GALLBLADDER: Within normal limits.  SPLEEN: Within normal limits.  PANCREAS: Within normal limits.  ADRENALS: Within normal limits.  KIDNEYS/URETERS: Ptotic left kidney. 3 mm calculus at the proximal left   ureter with mild left hydronephrosis and mild perinephric fat stranding.   No right hydronephrosis. Subcentimeter right-sided hypodensities too   small to characterize.    BLADDER: Minimally distended, grossly unremarkable.  REPRODUCTIVE ORGANS:    BOWEL: No bowel obstruction. Appendix  PERITONEUM/RETROPERITONEUM: Within normal limits.  VESSELS: Within normal limits.  LYMPH NODES: No lymphadenopathy.  ABDOMINAL WALL: Within normal limits.  BONES: Within normal limits.    IMPRESSION:  3 mm calculus in the proximal left ureter with mild left hydronephrosis   and mild left perinephric fat stranding.    Patchy consolidation and small adjacent groundglass in the right lower   lobe concerning for pneumonia in the appropriate clinical setting.    < end of copied text >

## 2024-09-24 NOTE — CONSULT NOTE ADULT - ASSESSMENT
PAtient seen and examined     FULL CONSULT TO FOLLOW     Check RVP   BCX with CONS -likely contaminant     On piperacillin-tazobactam   Can swith to ceftriaxone 2g IV daily   Anticipate discharge on oral treatment     D/w urology  54F with history of nephrolithiasis admitted on 9/23 with left flank pain, nausea, fever and hematuria. Found to have a left uretal stone s/p emergency stent placement. Patient also with URI symptoms.     ID consulted for positive blood culture    Admission BCX with CONS - probably contaminant   Repeat BCX ordered for AM   UCx with <10K  terrance   CT AP - 3 mm calculus in the prox left ureter with mild hydro and perinephric stranding. 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 Found to have a left uretal stone s/p emergency stent placement. Patient also with URI symptoms.     ID consulted for positive blood culture    Left pyelonephritis  Obstructive nephropathy  Nephrolithiasis    URI     Admission BCX with CONS - probably contaminant   Repeat BCX ordered for AM   UCx with <10K  terrance   CT AP - 3 mm calculus in the prox left ureter with mild hydro and perinephric stranding. 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

## 2024-09-24 NOTE — PROGRESS NOTE ADULT - SUBJECTIVE AND OBJECTIVE BOX
Urology f/u:    POD# 1 s/p cystoscopy with left stent insertion    Patient seen ans examined at bedside this am .  Awake, alert, responsive and resting in bed no acute over night events.   Feeling like just had surgery.  little run down appearing.  Vital Signs Last 24 Hrs  T(C): 36.8 (24 Sep 2024 04:40), Max: 37.8 (23 Sep 2024 15:42)  T(F): 98.2 (24 Sep 2024 04:40), Max: 100 (23 Sep 2024 15:42)  HR: 91 (24 Sep 2024 04:40) (85 - 107)  BP: 118/90 (24 Sep 2024 04:40) (105/70 - 134/75)  BP(mean): 75 (23 Sep 2024 21:43) (63 - 90)  RR: 18 (24 Sep 2024 04:40) (13 - 18)  SpO2: 93% (24 Sep 2024 04:40) (93% - 98%)    Parameters below as of 24 Sep 2024 04:40  Patient On (Oxygen Delivery Method): room air    Abdomen:  soft, n/d, some mild supra pubic discomfort.  :  voiding, states some burning discomfort ( explained- normal and will be self limiting post scoping  ) she understands .    Labs:                        12.1   5.84  )-----------( 175      ( 24 Sep 2024 06:32 )             36.6     09-24    139  |  107  |  6.3[L]  ----------------------------<  120[H]  3.6   |  20.0[L]  |  0.48[L]      Culture - Urine (09.23.24 @ 03:05)    Specimen Source: Clean Catch Clean Catch (Midstream)   Culture Results:   <10,000 CFU/mL Normal Urogenital Erin    Culture - Blood (09.23.24 @ 01:30)    -  Coagulase negative Staphylococcus: Detec   Gram Stain:   Growth in aerobic bottle: Gram Positive Cocci in Clusters  Growth in anaerobic bottle: Gram Positive Cocci in Clusters   Specimen Source: .Blood Blood-Peripheral    sensitivities pending     Impression:  stable POD# 1 , tolerated procedure well.  Stable   Discuss with Surgeon present situation and continued care and management  Plan:  Continue present care and management, encourage to continue fluids, OOB and ambulate and continue antibiotics and  f/u sensitivity results.              Urology f/u:    POD# 1 s/p septic stone: cystoscopy with left stent insertion    Patient seen ans examined at bedside this am .  Awake, alert, responsive and resting in bed no acute over night events.   Feeling like just had surgery.  little run down appearing.  Vital Signs Last 24 Hrs  T(C): 36.8 (24 Sep 2024 04:40), Max: 37.8 (23 Sep 2024 15:42)  T(F): 98.2 (24 Sep 2024 04:40), Max: 100 (23 Sep 2024 15:42)  HR: 91 (24 Sep 2024 04:40) (85 - 107)  BP: 118/90 (24 Sep 2024 04:40) (105/70 - 134/75)  BP(mean): 75 (23 Sep 2024 21:43) (63 - 90)  RR: 18 (24 Sep 2024 04:40) (13 - 18)  SpO2: 93% (24 Sep 2024 04:40) (93% - 98%)    Parameters below as of 24 Sep 2024 04:40  Patient On (Oxygen Delivery Method): room air    Abdomen:  soft, n/d, some mild supra pubic discomfort.  :  voiding, states some burning discomfort ( explained- normal and will be self limiting post scoping  ) she understands .    Labs:                        12.1   5.84  )-----------( 175      ( 24 Sep 2024 06:32 )             36.6     09-24    139  |  107  |  6.3[L]  ----------------------------<  120[H]  3.6   |  20.0[L]  |  0.48[L]      Culture - Urine (09.23.24 @ 03:05)    Specimen Source: Clean Catch Clean Catch (Midstream)   Culture Results:   <10,000 CFU/mL Normal Urogenital Erin    Culture - Blood (09.23.24 @ 01:30)    -  Coagulase negative Staphylococcus: Detec   Gram Stain:   Growth in aerobic bottle: Gram Positive Cocci in Clusters  Growth in anaerobic bottle: Gram Positive Cocci in Clusters   Specimen Source: .Blood Blood-Peripheral    sensitivities pending     Impression:  stable POD# 1 septic stone , tolerated procedure well.  Stable feeling little run down.  Blood culture report noted. (true or contaminant)  Discuss with Surgeon present situation and continued care and management  Plan:  Continue present care and management, encourage to continue fluids, OOB and ambulate and continue antibiotics and  f/u sensitivity results. And encouraged to continue incentive spirometer.  To contact infectious disease for recommendations

## 2024-09-24 NOTE — CHART NOTE - NSCHARTNOTEFT_GEN_A_CORE
Subjective: Patient seen and examined pod 0 s/p cysto with stent insertion. Patient doing well, denies pain. Complaining of chest discomfort with breathing and cough. Also, patient had 1x epistaxis episode Patient's CT earlier showed possible RLL PNA. Albuterol and Robitussin ordered. Also complaining of burning with urination. Patient is tolerating diet and voiding as expected. Denies fever, chills, nausea/ vomiting, shortness of breath or abdominal pain.     Vital Signs Last 24 Hrs  T(C): 37 (23 Sep 2024 23:23), Max: 37.8 (23 Sep 2024 15:42)  T(F): 98.6 (23 Sep 2024 23:23), Max: 100 (23 Sep 2024 15:42)  HR: 92 (24 Sep 2024 00:16) (85 - 107)  BP: 107/73 (23 Sep 2024 23:23) (105/70 - 134/75)  BP(mean): 75 (23 Sep 2024 21:43) (63 - 90)  RR: 18 (23 Sep 2024 23:23) (13 - 19)  SpO2: 93% (24 Sep 2024 00:16) (93% - 98%)    Parameters below as of 24 Sep 2024 00:16  Patient On (Oxygen Delivery Method): room air      Physical Exam:    Constitutional: resting comfortably in bed, NAD  HEENT: dried up blood at nostrils from post epistaxis x 1 episode  Respiratory: Respirations non-labored, no accessory muscle use  Gastrointestinal: Soft, non-tender, non-distended  Neurological: A&O x 3; without gross deficit  Musculoskeletal: Spontaneously moving all four extremities       LABS:                        13.6   7.94  )-----------( 231      ( 23 Sep 2024 01:30 )             39.9     -23    136  |  103  |  11.0  ----------------------------<  129[H]  4.1   |  19.0[L]  |  0.69    Ca    8.8      23 Sep 2024 01:30    TPro  6.9  /  Alb  3.9  /  TBili  0.4  /  DBili  x   /  AST  40[H]  /  ALT  39[H]  /  AlkPhos  87  23    PT/INR - ( 23 Sep 2024 01:30 )   PT: 13.9 sec;   INR: 1.26 ratio         PTT - ( 23 Sep 2024 01:30 )  PTT:29.5 sec  Urinalysis Basic - ( 23 Sep 2024 03:05 )    Color: Yellow / Appearance: Clear / S.009 / pH: x  Gluc: x / Ketone: Negative mg/dL  / Bili: Negative / Urobili: 0.2 mg/dL   Blood: x / Protein: Negative mg/dL / Nitrite: Negative   Leuk Esterase: Moderate / RBC: 82 /HPF / WBC 30 /HPF   Sq Epi: x / Non Sq Epi: 4 /HPF / Bacteria: Occasional /HPF      A: 53 yo female, pod 0 s/p cysto with stent insertion. Patient doing well urologically. Complaining of chest discomfort, no shortness of breath, +cough. Educated on Incentive spirometer and performed well infront of me.       Plan:   - Albuterol treatment and Robitussin ordered  - pain control prn   - c/w antibiotics   - monitor vitals   - encourage ambulation  - encourage incentive spirometer

## 2024-09-25 LAB
ANION GAP SERPL CALC-SCNC: 14 MMOL/L — SIGNIFICANT CHANGE UP (ref 5–17)
ANISOCYTOSIS BLD QL: SLIGHT — SIGNIFICANT CHANGE UP
BASOPHILS # BLD AUTO: 0 K/UL — SIGNIFICANT CHANGE UP (ref 0–0.2)
BASOPHILS NFR BLD AUTO: 0 % — SIGNIFICANT CHANGE UP (ref 0–2)
BUN SERPL-MCNC: 8 MG/DL — SIGNIFICANT CHANGE UP (ref 8–20)
CALCIUM SERPL-MCNC: 8.8 MG/DL — SIGNIFICANT CHANGE UP (ref 8.4–10.5)
CHLORIDE SERPL-SCNC: 103 MMOL/L — SIGNIFICANT CHANGE UP (ref 96–108)
CO2 SERPL-SCNC: 21 MMOL/L — LOW (ref 22–29)
CREAT SERPL-MCNC: 0.66 MG/DL — SIGNIFICANT CHANGE UP (ref 0.5–1.3)
CULTURE RESULTS: ABNORMAL
DACRYOCYTES BLD QL SMEAR: SLIGHT — SIGNIFICANT CHANGE UP
EGFR: 103 ML/MIN/1.73M2 — SIGNIFICANT CHANGE UP
EOSINOPHIL # BLD AUTO: 0 K/UL — SIGNIFICANT CHANGE UP (ref 0–0.5)
EOSINOPHIL NFR BLD AUTO: 0 % — SIGNIFICANT CHANGE UP (ref 0–6)
GIANT PLATELETS BLD QL SMEAR: PRESENT — SIGNIFICANT CHANGE UP
GLUCOSE SERPL-MCNC: 106 MG/DL — HIGH (ref 70–99)
HCT VFR BLD CALC: 38.3 % — SIGNIFICANT CHANGE UP (ref 34.5–45)
HGB BLD-MCNC: 12.4 G/DL — SIGNIFICANT CHANGE UP (ref 11.5–15.5)
LYMPHOCYTES # BLD AUTO: 0.5 K/UL — LOW (ref 1–3.3)
LYMPHOCYTES # BLD AUTO: 7.8 % — LOW (ref 13–44)
MANUAL SMEAR VERIFICATION: SIGNIFICANT CHANGE UP
MCHC RBC-ENTMCNC: 30.4 PG — SIGNIFICANT CHANGE UP (ref 27–34)
MCHC RBC-ENTMCNC: 32.4 GM/DL — SIGNIFICANT CHANGE UP (ref 32–36)
MCV RBC AUTO: 93.9 FL — SIGNIFICANT CHANGE UP (ref 80–100)
MONOCYTES # BLD AUTO: 0 K/UL — SIGNIFICANT CHANGE UP (ref 0–0.9)
MONOCYTES NFR BLD AUTO: 0 % — LOW (ref 2–14)
NEUTROPHILS # BLD AUTO: 5.88 K/UL — SIGNIFICANT CHANGE UP (ref 1.8–7.4)
NEUTROPHILS NFR BLD AUTO: 86.1 % — HIGH (ref 43–77)
NEUTS BAND # BLD: 6.1 % — SIGNIFICANT CHANGE UP (ref 0–8)
ORGANISM # SPEC MICROSCOPIC CNT: ABNORMAL
ORGANISM # SPEC MICROSCOPIC CNT: SIGNIFICANT CHANGE UP
PLAT MORPH BLD: NORMAL — SIGNIFICANT CHANGE UP
PLATELET # BLD AUTO: 204 K/UL — SIGNIFICANT CHANGE UP (ref 150–400)
POIKILOCYTOSIS BLD QL AUTO: SLIGHT — SIGNIFICANT CHANGE UP
POLYCHROMASIA BLD QL SMEAR: SLIGHT — SIGNIFICANT CHANGE UP
POTASSIUM SERPL-MCNC: 4.3 MMOL/L — SIGNIFICANT CHANGE UP (ref 3.5–5.3)
POTASSIUM SERPL-SCNC: 4.3 MMOL/L — SIGNIFICANT CHANGE UP (ref 3.5–5.3)
RBC # BLD: 4.08 M/UL — SIGNIFICANT CHANGE UP (ref 3.8–5.2)
RBC # FLD: 13.4 % — SIGNIFICANT CHANGE UP (ref 10.3–14.5)
RBC BLD AUTO: ABNORMAL
SMUDGE CELLS # BLD: PRESENT — SIGNIFICANT CHANGE UP
SODIUM SERPL-SCNC: 138 MMOL/L — SIGNIFICANT CHANGE UP (ref 135–145)
SPECIMEN SOURCE: SIGNIFICANT CHANGE UP
WBC # BLD: 6.38 K/UL — SIGNIFICANT CHANGE UP (ref 3.8–10.5)
WBC # FLD AUTO: 6.38 K/UL — SIGNIFICANT CHANGE UP (ref 3.8–10.5)

## 2024-09-25 PROCEDURE — 99232 SBSQ HOSP IP/OBS MODERATE 35: CPT

## 2024-09-25 RX ORDER — PANTOPRAZOLE SODIUM 40 MG/1
40 TABLET, DELAYED RELEASE ORAL ONCE
Refills: 0 | Status: COMPLETED | OUTPATIENT
Start: 2024-09-25 | End: 2024-09-26

## 2024-09-25 RX ADMIN — KETOROLAC TROMETHAMINE 15 MILLIGRAM(S): 10 TABLET, FILM COATED ORAL at 05:32

## 2024-09-25 RX ADMIN — Medication 975 MILLIGRAM(S): at 05:32

## 2024-09-25 RX ADMIN — KETOROLAC TROMETHAMINE 15 MILLIGRAM(S): 10 TABLET, FILM COATED ORAL at 06:30

## 2024-09-25 RX ADMIN — Medication 2000 MILLIGRAM(S): at 21:18

## 2024-09-25 RX ADMIN — Medication 975 MILLIGRAM(S): at 12:50

## 2024-09-25 RX ADMIN — Medication 975 MILLIGRAM(S): at 17:22

## 2024-09-25 RX ADMIN — Medication 975 MILLIGRAM(S): at 11:23

## 2024-09-25 RX ADMIN — Medication 975 MILLIGRAM(S): at 06:30

## 2024-09-25 RX ADMIN — IPRATROPIUM BROMIDE AND ALBUTEROL SULFATE 3 MILLILITER(S): .5; 3 SOLUTION RESPIRATORY (INHALATION) at 21:36

## 2024-09-25 RX ADMIN — KETOROLAC TROMETHAMINE 15 MILLIGRAM(S): 10 TABLET, FILM COATED ORAL at 11:23

## 2024-09-25 RX ADMIN — KETOROLAC TROMETHAMINE 15 MILLIGRAM(S): 10 TABLET, FILM COATED ORAL at 17:22

## 2024-09-25 RX ADMIN — IPRATROPIUM BROMIDE AND ALBUTEROL SULFATE 3 MILLILITER(S): .5; 3 SOLUTION RESPIRATORY (INHALATION) at 15:23

## 2024-09-25 RX ADMIN — IPRATROPIUM BROMIDE AND ALBUTEROL SULFATE 3 MILLILITER(S): .5; 3 SOLUTION RESPIRATORY (INHALATION) at 09:58

## 2024-09-25 RX ADMIN — Medication 975 MILLIGRAM(S): at 00:30

## 2024-09-25 RX ADMIN — KETOROLAC TROMETHAMINE 15 MILLIGRAM(S): 10 TABLET, FILM COATED ORAL at 00:30

## 2024-09-25 RX ADMIN — KETOROLAC TROMETHAMINE 15 MILLIGRAM(S): 10 TABLET, FILM COATED ORAL at 12:50

## 2024-09-25 NOTE — PROGRESS NOTE ADULT - SUBJECTIVE AND OBJECTIVE BOX
Urology f/u:  ID note and recs-noted and appreciated .     POD# 2 s/p cysto- left stent placement  Patient seen and examined this am at bedside, still feeling sort of run down.  Coughing still with a productive cough ( says Robitussin helped ) Encouraged to continue PO fluid intake, incentive spirometer, and OOB.     Vital Signs Last 24 Hrs  T(C): 37.8 (25 Sep 2024 05:00), Max: 38.3 (24 Sep 2024 12:42)  afebrile over night low grade this am 100.1- 5am  T(F): 100.1 (25 Sep 2024 05:00), Max: 100.9 (24 Sep 2024 12:42)  HR: 112 (25 Sep 2024 05:00) (74 - 112)  BP: 123/58 (25 Sep 2024 05:00) (102/65 - 138/84)  BP(mean): --  RR: 18 (25 Sep 2024 05:00) (18 - 18)  SpO2: 92% (25 Sep 2024 05:00) (91% - 98%)    Parameters below as of 25 Sep 2024 05:00  Patient On (Oxygen Delivery Method): room air    :  voiding adequate, still intermittently hematuric    I&O's Detail  24 Sep 2024 07:01  -  25 Sep 2024 07:00  --------------------------------------------------------  OUT:    Voided (mL): 1450 mL  Total OUT: 1450 mL      Labs:  CBC                        12.4   6.38  )-----------( 204      ( 25 Sep 2024 04:50 )             38.3     Chemistry:  09-25    138  |  103  |  8.0  ----------------------------<  106[H]  4.3   |  21.0[L]  |  0.66      repeat cultures sent- pending results    Impression:  POD# 2 tolerated procedure; stable slowly improving .  Mild persistent cough .     Discussed with Surgeon present situation and continued care and management  Plan:  Continue present care and management, encouraged OOB, continue incentive spirometer and PO fluids.  F/u repeat cultures.

## 2024-09-25 NOTE — PROGRESS NOTE ADULT - SUBJECTIVE AND OBJECTIVE BOX
[facility]  INFECTIOUS DISEASES at [place] and [place]  ===============================================================                  Fredis Soliz MD               Diplomates American Board of Internal Medicine & Infectious Diseases                * [place] Office - Appt - Tel  [phone] Fax [phone]                * [place] Office - Appt - Tel [phone] Fax [phone]                                  Hospital Consult line:  [phone]  ==============================================================    ELIS SHOOK [id number]    Follow up: pyelonephritis    No acute events overnight   Intermittently febrile   hemodynamically stable     I have personally reviewed the labs and data; pertinent labs and data are listed in this note; please see below.     _______________________________________________________________  REVIEW OF SYSTEMS  feeling better, but she was hoping her recovery to be much faster. Still with profound fatigue and fever. No dysuria. Continues to have hematuria (red wine urine). No nausea, vomiting or diarrhea. Ambulating in the room.   ________________________________________________________________  Allergies:  No Known Allergies    ________________________________________________________________  PHYSICAL EXAM  GEN: in NAD, lying in bed. obese  HEENT: Anicteric sclerae. Moist mucous membranes.   LUNGS: eupneic. CTA B/L.  HEART: RRR, no m/r/g  ABDOMEN: Soft, NT, ND.  +BS.    : No Isbell catheter  NEUROLOGIC: Generalized weakness but moving all extremities  PSYCHIATRIC: Appropriate affect and mood  SKIN: No rash,  LINES: PIV   ________________________________________________________________  Vitals:  T(F): 98.2 (25 Sep 2024 12:52), Max: 100.1 (25 Sep 2024 05:00)  HR: 97 (25 Sep 2024 12:52)  BP: 104/70 (25 Sep 2024 12:52)  RR: 18 (25 Sep 2024 12:52)  SpO2: 91% (25 Sep 2024 12:52) (90% - 98%)  temp max in last 48H T(F): , Max: 100.9 (09-24-24 @ 12:42)    Current Antibiotics:  cefTRIAXone Injectable. 2000 milliGRAM(s) IV Push every 24 hours  cefTRIAXone Injectable.        Other medications:  acetaminophen     Tablet .. 975 milliGRAM(s) Oral every 6 hours  albuterol/ipratropium for Nebulization 3 milliLiter(s) Nebulizer every 6 hours  influenza   Vaccine 0.5 milliLiter(s) IntraMuscular once  ketorolac   Injectable 15 milliGRAM(s) IV Push every 6 hours                            12.4   6.38  )-----------( 204      ( 25 Sep 2024 04:50 )             38.3     09-25    138  |  103  |  8.0  ----------------------------<  106[H]  4.3   |  21.0[L]  |  0.66    Ca    8.8      25 Sep 2024 04:50      RECENT CULTURES:  09-24 @ 12:30          NotDetec  09-23 @ 03:05 Clean Catch Clean Catch (Midstream)     <10,000 CFU/mL Normal Urogenital Erin        09-23 @ 01:30 .Blood Blood-Peripheral Blood Culture PCR    Growth in anaerobic bottle: Staphylococcus epidermidis Isolation of  Coagulase negative Staphylococcus from single blood culture sets may  represent contamination.     Growth in aerobic bottle: Staphylococcus hominis Isolation of Coagulase  negative Staphylococcus from single blood culture sets may represent  contamination.       Growth in aerobic bottle: Gram Positive Cocci in Clusters  Growth in anaerobic bottle: Gram Positive Cocci in Clusters      WBC Count: 6.38 K/uL (09-25-24 @ 04:50)  WBC Count: 5.84 K/uL (09-24-24 @ 06:32)  WBC Count: 7.94 K/uL (09-23-24 @ 01:30)    Creatinine: 0.66 mg/dL (09-25-24 @ 04:50)  Creatinine: 0.48 mg/dL (09-24-24 @ 06:32)  Creatinine: 0.69 mg/dL (09-23-24 @ 01:30)     SARS-CoV-2: NotDete (09-24-24 @ 12:30)    ________________________________________________________________  CARDIOLOGY     ________________________________________________________________  RADIOLOGY  < from: CT Abdomen and Pelvis w/ IV Cont (09.23.24 @ 04:08) >  FINDINGS:  LOWER CHEST: Patchy consolidation and small adjacent groundglass in the   right lower lobe concerning for pneumonia in the appropriate clinical   setting.    LIVER: Within normal limits.  BILE DUCTS: Normal caliber.  GALLBLADDER: Within normal limits.  SPLEEN: Within normal limits.  PANCREAS: Within normal limits.  ADRENALS: Within normal limits.  KIDNEYS/URETERS: Ptotic left kidney. 3 mm calculus at the proximal left   ureter with mild left hydronephrosis and mild perinephric fat stranding.   No right hydronephrosis. Subcentimeter right-sided hypodensities too   small to characterize.    BLADDER: Minimally distended, grossly unremarkable.  REPRODUCTIVE ORGANS:    BOWEL: No bowel obstruction. Appendix  PERITONEUM/RETROPERITONEUM: Within normal limits.  VESSELS: Within normal limits.  LYMPH NODES: No lymphadenopathy.  ABDOMINAL WALL: Within normal limits.  BONES: Within normal limits.    IMPRESSION:  3 mm calculus in the proximal left ureter with mild left hydronephrosis   and mild left perinephric fat stranding.    Patchy consolidation and small adjacent groundglass in the right lower   lobe concerning for pneumonia in the appropriate clinical setting.    < end of copied text >

## 2024-09-26 ENCOUNTER — TRANSCRIPTION ENCOUNTER (OUTPATIENT)
Age: 54
End: 2024-09-26

## 2024-09-26 VITALS
RESPIRATION RATE: 18 BRPM | OXYGEN SATURATION: 94 % | TEMPERATURE: 98 F | HEART RATE: 90 BPM | SYSTOLIC BLOOD PRESSURE: 134 MMHG | DIASTOLIC BLOOD PRESSURE: 85 MMHG

## 2024-09-26 LAB
ANION GAP SERPL CALC-SCNC: 13 MMOL/L — SIGNIFICANT CHANGE UP (ref 5–17)
BUN SERPL-MCNC: 6.8 MG/DL — LOW (ref 8–20)
CALCIUM SERPL-MCNC: 8.7 MG/DL — SIGNIFICANT CHANGE UP (ref 8.4–10.5)
CHLORIDE SERPL-SCNC: 105 MMOL/L — SIGNIFICANT CHANGE UP (ref 96–108)
CO2 SERPL-SCNC: 20 MMOL/L — LOW (ref 22–29)
CREAT SERPL-MCNC: 0.56 MG/DL — SIGNIFICANT CHANGE UP (ref 0.5–1.3)
EGFR: 107 ML/MIN/1.73M2 — SIGNIFICANT CHANGE UP
GLUCOSE SERPL-MCNC: 97 MG/DL — SIGNIFICANT CHANGE UP (ref 70–99)
HCT VFR BLD CALC: 34.7 % — SIGNIFICANT CHANGE UP (ref 34.5–45)
HGB BLD-MCNC: 11.4 G/DL — LOW (ref 11.5–15.5)
LEGIONELLA AG UR QL: NEGATIVE — SIGNIFICANT CHANGE UP
MCHC RBC-ENTMCNC: 30.8 PG — SIGNIFICANT CHANGE UP (ref 27–34)
MCHC RBC-ENTMCNC: 32.9 GM/DL — SIGNIFICANT CHANGE UP (ref 32–36)
MCV RBC AUTO: 93.8 FL — SIGNIFICANT CHANGE UP (ref 80–100)
PLATELET # BLD AUTO: 201 K/UL — SIGNIFICANT CHANGE UP (ref 150–400)
POTASSIUM SERPL-MCNC: 4.4 MMOL/L — SIGNIFICANT CHANGE UP (ref 3.5–5.3)
POTASSIUM SERPL-SCNC: 4.4 MMOL/L — SIGNIFICANT CHANGE UP (ref 3.5–5.3)
RBC # BLD: 3.7 M/UL — LOW (ref 3.8–5.2)
RBC # FLD: 13.7 % — SIGNIFICANT CHANGE UP (ref 10.3–14.5)
S PNEUM AG UR QL: NEGATIVE — SIGNIFICANT CHANGE UP
SODIUM SERPL-SCNC: 138 MMOL/L — SIGNIFICANT CHANGE UP (ref 135–145)
WBC # BLD: 5.82 K/UL — SIGNIFICANT CHANGE UP (ref 3.8–10.5)
WBC # FLD AUTO: 5.82 K/UL — SIGNIFICANT CHANGE UP (ref 3.8–10.5)

## 2024-09-26 PROCEDURE — 96375 TX/PRO/DX INJ NEW DRUG ADDON: CPT

## 2024-09-26 PROCEDURE — C1769: CPT

## 2024-09-26 PROCEDURE — C2617: CPT

## 2024-09-26 PROCEDURE — 80053 COMPREHEN METABOLIC PANEL: CPT

## 2024-09-26 PROCEDURE — 84132 ASSAY OF SERUM POTASSIUM: CPT

## 2024-09-26 PROCEDURE — 87077 CULTURE AEROBIC IDENTIFY: CPT

## 2024-09-26 PROCEDURE — 81001 URINALYSIS AUTO W/SCOPE: CPT

## 2024-09-26 PROCEDURE — 82330 ASSAY OF CALCIUM: CPT

## 2024-09-26 PROCEDURE — 80048 BASIC METABOLIC PNL TOTAL CA: CPT

## 2024-09-26 PROCEDURE — 83605 ASSAY OF LACTIC ACID: CPT

## 2024-09-26 PROCEDURE — 85025 COMPLETE CBC W/AUTO DIFF WBC: CPT

## 2024-09-26 PROCEDURE — 76000 FLUOROSCOPY <1 HR PHYS/QHP: CPT

## 2024-09-26 PROCEDURE — 99232 SBSQ HOSP IP/OBS MODERATE 35: CPT

## 2024-09-26 PROCEDURE — 36415 COLL VENOUS BLD VENIPUNCTURE: CPT

## 2024-09-26 PROCEDURE — 85018 HEMOGLOBIN: CPT

## 2024-09-26 PROCEDURE — 82947 ASSAY GLUCOSE BLOOD QUANT: CPT

## 2024-09-26 PROCEDURE — 87150 DNA/RNA AMPLIFIED PROBE: CPT

## 2024-09-26 PROCEDURE — 84295 ASSAY OF SERUM SODIUM: CPT

## 2024-09-26 PROCEDURE — 87899 AGENT NOS ASSAY W/OPTIC: CPT

## 2024-09-26 PROCEDURE — 85610 PROTHROMBIN TIME: CPT

## 2024-09-26 PROCEDURE — 0225U NFCT DS DNA&RNA 21 SARSCOV2: CPT

## 2024-09-26 PROCEDURE — 99285 EMERGENCY DEPT VISIT HI MDM: CPT | Mod: 25

## 2024-09-26 PROCEDURE — 94760 N-INVAS EAR/PLS OXIMETRY 1: CPT

## 2024-09-26 PROCEDURE — 87040 BLOOD CULTURE FOR BACTERIA: CPT

## 2024-09-26 PROCEDURE — 93005 ELECTROCARDIOGRAM TRACING: CPT

## 2024-09-26 PROCEDURE — 96374 THER/PROPH/DIAG INJ IV PUSH: CPT

## 2024-09-26 PROCEDURE — 74177 CT ABD & PELVIS W/CONTRAST: CPT | Mod: MC

## 2024-09-26 PROCEDURE — 85014 HEMATOCRIT: CPT

## 2024-09-26 PROCEDURE — 82435 ASSAY OF BLOOD CHLORIDE: CPT

## 2024-09-26 PROCEDURE — 85027 COMPLETE CBC AUTOMATED: CPT

## 2024-09-26 PROCEDURE — 87449 NOS EACH ORGANISM AG IA: CPT

## 2024-09-26 PROCEDURE — 94640 AIRWAY INHALATION TREATMENT: CPT

## 2024-09-26 PROCEDURE — 82803 BLOOD GASES ANY COMBINATION: CPT

## 2024-09-26 PROCEDURE — 85730 THROMBOPLASTIN TIME PARTIAL: CPT

## 2024-09-26 PROCEDURE — 96361 HYDRATE IV INFUSION ADD-ON: CPT

## 2024-09-26 PROCEDURE — 71045 X-RAY EXAM CHEST 1 VIEW: CPT

## 2024-09-26 PROCEDURE — 87086 URINE CULTURE/COLONY COUNT: CPT

## 2024-09-26 RX ORDER — ACETAMINOPHEN 325 MG
3 TABLET ORAL
Qty: 0 | Refills: 0 | DISCHARGE
Start: 2024-09-26

## 2024-09-26 RX ORDER — BENZONATATE 150 MG/1
100 CAPSULE ORAL THREE TIMES A DAY
Refills: 0 | Status: DISCONTINUED | OUTPATIENT
Start: 2024-09-26 | End: 2024-09-26

## 2024-09-26 RX ORDER — BENZONATATE 150 MG/1
1 CAPSULE ORAL
Qty: 15 | Refills: 0
Start: 2024-09-26 | End: 2024-09-30

## 2024-09-26 RX ORDER — TAMSULOSIN HCL 0.4 MG
1 CAPSULE ORAL
Qty: 30 | Refills: 0
Start: 2024-09-26 | End: 2024-10-25

## 2024-09-26 RX ORDER — OXYBUTYNIN CHLORIDE 5 MG
1 TABLET ORAL
Qty: 30 | Refills: 0
Start: 2024-09-26 | End: 2024-10-10

## 2024-09-26 RX ADMIN — Medication 975 MILLIGRAM(S): at 01:05

## 2024-09-26 RX ADMIN — IPRATROPIUM BROMIDE AND ALBUTEROL SULFATE 3 MILLILITER(S): .5; 3 SOLUTION RESPIRATORY (INHALATION) at 14:25

## 2024-09-26 RX ADMIN — KETOROLAC TROMETHAMINE 15 MILLIGRAM(S): 10 TABLET, FILM COATED ORAL at 12:28

## 2024-09-26 RX ADMIN — IPRATROPIUM BROMIDE AND ALBUTEROL SULFATE 3 MILLILITER(S): .5; 3 SOLUTION RESPIRATORY (INHALATION) at 08:55

## 2024-09-26 RX ADMIN — Medication 975 MILLIGRAM(S): at 12:28

## 2024-09-26 RX ADMIN — KETOROLAC TROMETHAMINE 15 MILLIGRAM(S): 10 TABLET, FILM COATED ORAL at 17:39

## 2024-09-26 RX ADMIN — KETOROLAC TROMETHAMINE 15 MILLIGRAM(S): 10 TABLET, FILM COATED ORAL at 01:02

## 2024-09-26 RX ADMIN — KETOROLAC TROMETHAMINE 15 MILLIGRAM(S): 10 TABLET, FILM COATED ORAL at 05:14

## 2024-09-26 RX ADMIN — PANTOPRAZOLE SODIUM 40 MILLIGRAM(S): 40 TABLET, DELAYED RELEASE ORAL at 00:02

## 2024-09-26 RX ADMIN — Medication 975 MILLIGRAM(S): at 05:13

## 2024-09-26 RX ADMIN — KETOROLAC TROMETHAMINE 15 MILLIGRAM(S): 10 TABLET, FILM COATED ORAL at 11:28

## 2024-09-26 RX ADMIN — Medication 975 MILLIGRAM(S): at 17:39

## 2024-09-26 RX ADMIN — KETOROLAC TROMETHAMINE 15 MILLIGRAM(S): 10 TABLET, FILM COATED ORAL at 00:02

## 2024-09-26 RX ADMIN — Medication 975 MILLIGRAM(S): at 06:13

## 2024-09-26 RX ADMIN — KETOROLAC TROMETHAMINE 15 MILLIGRAM(S): 10 TABLET, FILM COATED ORAL at 06:14

## 2024-09-26 RX ADMIN — Medication 975 MILLIGRAM(S): at 00:05

## 2024-09-26 RX ADMIN — Medication 975 MILLIGRAM(S): at 11:29

## 2024-09-26 RX ADMIN — GUAIFENESIN 100 MILLIGRAM(S): 100 SOLUTION ORAL at 11:29

## 2024-09-26 NOTE — DISCHARGE NOTE PROVIDER - NSDCMRMEDTOKEN_GEN_ALL_CORE_FT
acetaminophen 325 mg oral tablet: 3 tab(s) orally every 6 hours  benzonatate 100 mg oral capsule: 1 cap(s) orally 3 times a day as needed for Cough   mg oral tablet: 1 tab(s) orally every 6 hours as needed for  moderate pain  levoFLOXacin 500 mg oral tablet: 1 tab(s) orally once a day  oxyBUTYnin 5 mg oral tablet: 1 tab(s) orally 2 times a day as needed for Bladder spasms  tamsulosin 0.4 mg oral capsule: 1 cap(s) orally once a day (at bedtime)

## 2024-09-26 NOTE — DISCHARGE NOTE PROVIDER - CARE PROVIDER_API CALL
Cedric Paiz  Urology  [address] [place] [id number]  Phone: [phone]  Fax: [phone]  Follow Up Time: 1 week

## 2024-09-26 NOTE — PROGRESS NOTE ADULT - TIME BILLING
Chart review, interpretation of laboratory and imaging results,  patient evaluation, medication review, documentation, coordination with medical team. Interpretation  and review of microbiologic data.
Chart review, interpretation of laboratory and imaging results,  patient evaluation, medication review, documentation, coordination with medical team. Interpretation  and review of microbiologic data.

## 2024-09-26 NOTE — PROGRESS NOTE ADULT - SUBJECTIVE AND OBJECTIVE BOX
[facility]  INFECTIOUS DISEASES at [place] and [place]  ===============================================================                  Fredis Soliz MD               Diplomates American Board of Internal Medicine & Infectious Diseases                * [place] Office - Appt - Tel  [phone] Fax [phone]                * [place] Office - Appt - Tel [phone] Fax [phone]                                  Hospital Consult line:  [phone]  ==============================================================    ELIS SHOOK [id number]    Follow up: pyelonephritis, PNA     Afebrile for >24h but also on Tylenol and Ketorolac ATC?  On RA   No acute events overnight   hemodynamically stable     I have personally reviewed the labs and data; pertinent labs and data are listed in this note; please see below.     _______________________________________________________________  REVIEW OF SYSTEMS  Overall better but still very weak, tired. Ongoing cough. At night, feels her heart racing. Constant HA. No nausea or vomiting. Hematuria clearing up.     ________________________________________________________________  Allergies:  No Known Allergies      ________________________________________________________________  PHYSICAL EXAM  GEN: in NAD, sitting in chair   HEENT: Anicteric sclerae. Moist mucous membrane  LUNGS: eupneic. CTA B/L.  HEART: RRR, no m/r/g  : No Isbell catheter  EXTREMITIES: without  edema.  NEUROLOGIC: Generalized weakness but moving all extremities   SKIN: No rash  LINES: PIV   ________________________________________________________________  Vitals:  T(F): 98.3 (26 Sep 2024 09:25), Max: 98.5 (25 Sep 2024 20:32)  HR: 92 (26 Sep 2024 09:25)  BP: 124/86 (26 Sep 2024 09:25)  RR: 17 (26 Sep 2024 09:25)  SpO2: 92% (26 Sep 2024 09:25) (91% - 95%)  temp max in last 48H T(F): , Max: 100.9 (09-24-24 @ 12:42)    Current Antibiotics:  cefTRIAXone Injectable. 2000 milliGRAM(s) IV Push every 24 hours  cefTRIAXone Injectable.        Other medications:  acetaminophen     Tablet .. 975 milliGRAM(s) Oral every 6 hours  albuterol/ipratropium for Nebulization 3 milliLiter(s) Nebulizer every 6 hours  influenza   Vaccine 0.5 milliLiter(s) IntraMuscular once  ketorolac   Injectable 15 milliGRAM(s) IV Push every 6 hours                            11.4   5.82  )-----------( 201      ( 26 Sep 2024 05:29 )             34.7     09-26    138  |  105  |  6.8[L]  ----------------------------<  97  4.4   |  20.0[L]  |  0.56    Ca    8.7      26 Sep 2024 05:29      RECENT CULTURES:  09-25 @ 04:50 .Blood Blood-Venous     No growth at 24 hours      09-24 @ 12:30    RVP with SARS-CoV-2   NotDetec      09-23 @ 03:05 Clean Catch Clean Catch (Midstream)     <10,000 CFU/mL Normal Urogenital Erin      09-23 @ 01:30 .Blood Blood-Peripheral Blood Culture PCR    Growth in anaerobic bottle: Staphylococcus epidermidis Isolation of  Coagulase negative Staphylococcus from single blood culture sets may  represent contamination.    Growth in aerobic bottle: Staphylococcus hominis Isolation of Coagulase  negative Staphylococcus from single blood culture sets may represent  contamination.     WBC Count: 5.82 K/uL (09-26-24 @ 05:29)  WBC Count: 6.38 K/uL (09-25-24 @ 04:50)  WBC Count: 5.84 K/uL (09-24-24 @ 06:32)  WBC Count: 7.94 K/uL (09-23-24 @ 01:30)    Creatinine: 0.56 mg/dL (09-26-24 @ 05:29)  Creatinine: 0.66 mg/dL (09-25-24 @ 04:50)  Creatinine: 0.48 mg/dL (09-24-24 @ 06:32)  Creatinine: 0.69 mg/dL (09-23-24 @ 01:30)     SARS-CoV-2: NotDetec (09-24-24 @ 12:30)    ________________________________________________________________  CARDIOLOGY   ________________________________________________________________  RADIOLOGY  < from: CT Abdomen and Pelvis w/ IV Cont (09.23.24 @ 04:08) >    FINDINGS:  LOWER CHEST: Patchy consolidation and small adjacent groundglass in the   right lower lobe concerning for pneumonia in the appropriate clinical   setting.    LIVER: Within normal limits.  BILE DUCTS: Normal caliber.  GALLBLADDER: Within normal limits.  SPLEEN: Within normal limits.  PANCREAS: Within normal limits.  ADRENALS: Within normal limits.  KIDNEYS/URETERS: Ptotic left kidney. 3 mm calculus at the proximal left   ureter with mild left hydronephrosis and mild perinephric fat stranding.   No right hydronephrosis. Subcentimeter right-sided hypodensities too   small to characterize.    BLADDER: Minimally distended, grossly unremarkable.  REPRODUCTIVE ORGANS:    BOWEL: No bowel obstruction. Appendix  PERITONEUM/RETROPERITONEUM: Within normal limits.  VESSELS: Within normal limits.  LYMPH NODES: No lymphadenopathy.  ABDOMINAL WALL: Within normal limits.  BONES: Within normal limits.    IMPRESSION:  3 mm calculus in the proximal left ureter with mild left hydronephrosis   and mild left perinephric fat stranding.    Patchy consolidation and small adjacent groundglass in the right lower   lobe concerning for pneumonia in the appropriate clinical setting.    < end of copied text >

## 2024-09-26 NOTE — DISCHARGE NOTE NURSING/CASE MANAGEMENT/SOCIAL WORK - PATIENT PORTAL LINK FT
You can access the FollowMyHealth Patient Portal offered by [facility] by registering at the following website: [email or web address]. By joining [facility]’s FollowMyHealth portal, you will also be able to view your health information using other applications (apps) compatible with our system.

## 2024-09-26 NOTE — DISCHARGE NOTE PROVIDER - NSDCCPCAREPLAN_GEN_ALL_CORE_FT
PRINCIPAL DISCHARGE DIAGNOSIS  Diagnosis: Kidney stone on left side  Assessment and Plan of Treatment: - no heavy lifting or straining  - drink plenty of fluids  - call the office if you develop a fever, chills, nausea or vomiting  - take antibiotics until finished      SECONDARY DISCHARGE DIAGNOSES  Diagnosis: Acute UTI  Assessment and Plan of Treatment:

## 2024-09-26 NOTE — DISCHARGE NOTE PROVIDER - NSDCCPTREATMENT_GEN_ALL_CORE_FT
PRINCIPAL PROCEDURE  Procedure: Cystoscopy, with ureteral stent insertion  Findings and Treatment: left side

## 2024-09-26 NOTE — DISCHARGE NOTE PROVIDER - HOSPITAL COURSE
53 yo female presents with left flank pain, hematuria and fever at home.  Pt also has early signs of an URI starting.  Pt was admitted for a septic stone, was taken to the OR for a left ureteral stent placement.  Pt was on IVabx, ID was called for blood cx's that were gm +.  pt had an URI, possible RLL PNA.  Pt had low grade fevers which subsided.  Repeat blood cx's were negative and pt slowly improved.  Pt is stable for d/c to home on PO abx and cough medication.  Pt will follow up for future stone management with Dr. Paiz.

## 2024-09-26 NOTE — PROGRESS NOTE ADULT - SUBJECTIVE AND OBJECTIVE BOX
Subjective:54yFemale POD#3 s/p cystoscopy, left ureteral stent placement for septic stone, URI.  pt feeling better today, still has a cough.  pt ambulating, voiding and tolerating a diet.  pt now >24hrs afebrile.        Vital Signs Last 24 Hrs  T(C): 36.8 (26 Sep 2024 09:25), Max: 36.9 (25 Sep 2024 20:32)  T(F): 98.3 (26 Sep 2024 09:25), Max: 98.5 (25 Sep 2024 20:32)  HR: 92 (26 Sep 2024 09:25) (82 - 100)  BP: 124/86 (26 Sep 2024 09:25) (104/70 - 143/90)  BP(mean): --  RR: 17 (26 Sep 2024 09:25) (17 - 18)  SpO2: 92% (26 Sep 2024 09:25) (91% - 95%)    Parameters below as of 26 Sep 2024 09:25  Patient On (Oxygen Delivery Method): room air      I&O's Detail    25 Sep 2024 07:01  -  26 Sep 2024 07:00  --------------------------------------------------------  IN:    Oral Fluid: 240 mL  Total IN: 240 mL    OUT:    Voided (mL): 2950 mL  Total OUT: 2950 mL    Total NET: -2710 mL          Labs:                        11.4   5.82  )-----------( 201      ( 26 Sep 2024 05:29 )             34.7     09-26    138  |  105  |  6.8[L]  ----------------------------<  97  4.4   |  20.0[L]  |  0.56    Ca    8.7      26 Sep 2024 05:29            Culture - Blood (collected 25 Sep 2024 04:50)  Source: .Blood Blood-Venous  Preliminary Report (26 Sep 2024 09:02):    No growth at 24 hours

## 2024-09-26 NOTE — PROGRESS NOTE ADULT - ASSESSMENT
54F with history of nephrolithiasis admitted on 9/23 with left flank pain, nausea, fever and hematuria. Found to have a left uretal stone s/p emergency stent placement. Patient also with URI symptoms.     ID consulted for positive blood culture    Left pyelonephritis  Obstructive nephropathy  Nephrolithiasis    PNA     Admission BCX with different CONS (S. hominis and S. epi) consistent procurement contaminant   Repeat 9/25 BCX ngtd  UCx with <10K  terrance   CT AP - 3 mm calculus in the prox left ureter with mild hydro and perinephric stranding. Patchy consolidation in RLL   RVP negative   Strep pneumo urine AG - negative   Legionella urine AG - pending   No leukocytosis  Monitor temp - afebrile for >24h but also on ketorolac and acetaminophen ATC   BP stable   On ceftriaxone 2g IV daily     On discharge, complete 10 day course with levofloxacin 750 mg PO daily   Instructions provided to avoid concomitant intake of levo with dairy products.   May use Tessalon perle PRN cough      D/w Urology    d/w patient and family at bedside 
54F with history of nephrolithiasis admitted on 9/23 with left flank pain, nausea, fever and hematuria. Found to have a left uretal stone s/p emergency stent placement. Patient also with URI symptoms.     ID consulted for positive blood culture    Left pyelonephritis  Obstructive nephropathy  Nephrolithiasis    URI     Admission BCX with different CONS (S. hominis and S. epi) consistent procurement contamination   Repeat BCX collected 9/25   UCx with <10K  terrance   CT AP - 3 mm calculus in the prox left ureter with mild hydro and perinephric stranding. Patchy consolidation in RLL   RVP negative   Check strep pneumo and legionella urine AG - ordered   No leukocytosis; +neutrophilia   Monitor temp - intermittently febrile   BP stable     Continue ceftriaxone 2g IV daily   anticipate d/c on levo      Will follow 
53 yo female POD#3 s/p cystoscopy, left ureteral stent placement for septic stone, URI, RLL PNA  - repeat blood cx's neg  - cont abx  - tylenol/motrin prn  - d/c home today on levofloxacin for 7 days  - tamsulosin, oxybutynin for stent pain/discomfort  - antitussive

## 2024-09-30 PROBLEM — N20.0 CALCULUS OF KIDNEY: Chronic | Status: ACTIVE | Noted: 2024-09-23

## 2024-09-30 LAB
CULTURE RESULTS: SIGNIFICANT CHANGE UP
SPECIMEN SOURCE: SIGNIFICANT CHANGE UP

## 2024-10-04 ENCOUNTER — NON-APPOINTMENT (OUTPATIENT)
Age: 54
End: 2024-10-04

## 2024-10-04 PROBLEM — Z00.00 ENCOUNTER FOR PREVENTIVE HEALTH EXAMINATION: Status: ACTIVE | Noted: 2024-10-04

## 2024-10-10 ENCOUNTER — APPOINTMENT (OUTPATIENT)
Dept: UROLOGY | Facility: CLINIC | Age: 54
End: 2024-10-10
Payer: COMMERCIAL

## 2024-10-10 VITALS — SYSTOLIC BLOOD PRESSURE: 143 MMHG | HEART RATE: 65 BPM | DIASTOLIC BLOOD PRESSURE: 86 MMHG

## 2024-10-10 DIAGNOSIS — N20.1 CALCULUS OF URETER: ICD-10-CM

## 2024-10-10 PROCEDURE — 99214 OFFICE O/P EST MOD 30 MIN: CPT

## 2024-10-16 ENCOUNTER — NON-APPOINTMENT (OUTPATIENT)
Age: 54
End: 2024-10-16

## 2024-10-22 DIAGNOSIS — N20.1 CALCULUS OF URETER: ICD-10-CM

## 2024-10-22 RX ORDER — OXYBUTYNIN CHLORIDE 5 MG/1
5 TABLET ORAL
Qty: 30 | Refills: 0 | Status: ACTIVE | COMMUNITY
Start: 2024-10-22 | End: 1900-01-01

## 2024-10-24 ENCOUNTER — OUTPATIENT (OUTPATIENT)
Dept: OUTPATIENT SERVICES | Facility: HOSPITAL | Age: 54
LOS: 1 days | End: 2024-10-24
Payer: COMMERCIAL

## 2024-10-24 VITALS
HEART RATE: 74 BPM | WEIGHT: 172.62 LBS | OXYGEN SATURATION: 98 % | TEMPERATURE: 97 F | HEIGHT: 64 IN | SYSTOLIC BLOOD PRESSURE: 120 MMHG | RESPIRATION RATE: 20 BRPM | DIASTOLIC BLOOD PRESSURE: 70 MMHG

## 2024-10-24 DIAGNOSIS — N20.1 CALCULUS OF URETER: ICD-10-CM

## 2024-10-24 DIAGNOSIS — N20.0 CALCULUS OF KIDNEY: ICD-10-CM

## 2024-10-24 DIAGNOSIS — Z98.891 HISTORY OF UTERINE SCAR FROM PREVIOUS SURGERY: Chronic | ICD-10-CM

## 2024-10-24 DIAGNOSIS — Z01.818 ENCOUNTER FOR OTHER PREPROCEDURAL EXAMINATION: ICD-10-CM

## 2024-10-24 DIAGNOSIS — Z98.890 OTHER SPECIFIED POSTPROCEDURAL STATES: Chronic | ICD-10-CM

## 2024-10-24 DIAGNOSIS — Z90.722 ACQUIRED ABSENCE OF OVARIES, BILATERAL: Chronic | ICD-10-CM

## 2024-10-24 DIAGNOSIS — Z96.0 PRESENCE OF UROGENITAL IMPLANTS: Chronic | ICD-10-CM

## 2024-10-24 DIAGNOSIS — Z90.13 ACQUIRED ABSENCE OF BILATERAL BREASTS AND NIPPLES: Chronic | ICD-10-CM

## 2024-10-24 DIAGNOSIS — Z29.9 ENCOUNTER FOR PROPHYLACTIC MEASURES, UNSPECIFIED: ICD-10-CM

## 2024-10-24 LAB
A1C WITH ESTIMATED AVERAGE GLUCOSE RESULT: 5.3 % — SIGNIFICANT CHANGE UP (ref 4–5.6)
AMORPH CRY # UR COMP ASSIST: PRESENT
ANION GAP SERPL CALC-SCNC: 12 MMOL/L — SIGNIFICANT CHANGE UP (ref 5–17)
APPEARANCE UR: ABNORMAL
BACTERIA # UR AUTO: NEGATIVE /HPF — SIGNIFICANT CHANGE UP
BASOPHILS # BLD AUTO: 0.03 K/UL — SIGNIFICANT CHANGE UP (ref 0–0.2)
BASOPHILS NFR BLD AUTO: 0.5 % — SIGNIFICANT CHANGE UP (ref 0–2)
BILIRUB UR-MCNC: ABNORMAL
BUN SERPL-MCNC: 13.5 MG/DL — SIGNIFICANT CHANGE UP (ref 8–20)
CALCIUM SERPL-MCNC: 9.6 MG/DL — SIGNIFICANT CHANGE UP (ref 8.4–10.5)
CHLORIDE SERPL-SCNC: 101 MMOL/L — SIGNIFICANT CHANGE UP (ref 96–108)
CO2 SERPL-SCNC: 24 MMOL/L — SIGNIFICANT CHANGE UP (ref 22–29)
COLOR SPEC: ABNORMAL
CREAT SERPL-MCNC: 0.73 MG/DL — SIGNIFICANT CHANGE UP (ref 0.5–1.3)
DIFF PNL FLD: ABNORMAL
EGFR: 96 ML/MIN/1.73M2 — SIGNIFICANT CHANGE UP
EOSINOPHIL # BLD AUTO: 0.18 K/UL — SIGNIFICANT CHANGE UP (ref 0–0.5)
EOSINOPHIL NFR BLD AUTO: 3.2 % — SIGNIFICANT CHANGE UP (ref 0–6)
ESTIMATED AVERAGE GLUCOSE: 105 MG/DL — SIGNIFICANT CHANGE UP (ref 68–114)
GLUCOSE SERPL-MCNC: 93 MG/DL — SIGNIFICANT CHANGE UP (ref 70–99)
GLUCOSE UR QL: NEGATIVE MG/DL — SIGNIFICANT CHANGE UP
HCT VFR BLD CALC: 37.6 % — SIGNIFICANT CHANGE UP (ref 34.5–45)
HGB BLD-MCNC: 12.5 G/DL — SIGNIFICANT CHANGE UP (ref 11.5–15.5)
IMM GRANULOCYTES NFR BLD AUTO: 0.4 % — SIGNIFICANT CHANGE UP (ref 0–0.9)
KETONES UR-MCNC: NEGATIVE MG/DL — SIGNIFICANT CHANGE UP
LEUKOCYTE ESTERASE UR-ACNC: ABNORMAL
LYMPHOCYTES # BLD AUTO: 2.06 K/UL — SIGNIFICANT CHANGE UP (ref 1–3.3)
LYMPHOCYTES # BLD AUTO: 36.1 % — SIGNIFICANT CHANGE UP (ref 13–44)
MCHC RBC-ENTMCNC: 30.5 PG — SIGNIFICANT CHANGE UP (ref 27–34)
MCHC RBC-ENTMCNC: 33.2 GM/DL — SIGNIFICANT CHANGE UP (ref 32–36)
MCV RBC AUTO: 91.7 FL — SIGNIFICANT CHANGE UP (ref 80–100)
MONOCYTES # BLD AUTO: 0.44 K/UL — SIGNIFICANT CHANGE UP (ref 0–0.9)
MONOCYTES NFR BLD AUTO: 7.7 % — SIGNIFICANT CHANGE UP (ref 2–14)
NEUTROPHILS # BLD AUTO: 2.97 K/UL — SIGNIFICANT CHANGE UP (ref 1.8–7.4)
NEUTROPHILS NFR BLD AUTO: 52.1 % — SIGNIFICANT CHANGE UP (ref 43–77)
NITRITE UR-MCNC: POSITIVE
PH UR: 5 — SIGNIFICANT CHANGE UP (ref 5–8)
PLATELET # BLD AUTO: 232 K/UL — SIGNIFICANT CHANGE UP (ref 150–400)
POTASSIUM SERPL-MCNC: 4.4 MMOL/L — SIGNIFICANT CHANGE UP (ref 3.5–5.3)
POTASSIUM SERPL-SCNC: 4.4 MMOL/L — SIGNIFICANT CHANGE UP (ref 3.5–5.3)
PROT UR-MCNC: 300 MG/DL
RBC # BLD: 4.1 M/UL — SIGNIFICANT CHANGE UP (ref 3.8–5.2)
RBC # FLD: 13.1 % — SIGNIFICANT CHANGE UP (ref 10.3–14.5)
RBC CASTS # UR COMP ASSIST: 300 /HPF — HIGH (ref 0–4)
SODIUM SERPL-SCNC: 137 MMOL/L — SIGNIFICANT CHANGE UP (ref 135–145)
SP GR SPEC: 1.02 — SIGNIFICANT CHANGE UP (ref 1–1.03)
SQUAMOUS # UR AUTO: 3 /HPF — SIGNIFICANT CHANGE UP (ref 0–5)
UROBILINOGEN FLD QL: 1 MG/DL — SIGNIFICANT CHANGE UP (ref 0.2–1)
WBC # BLD: 5.7 K/UL — SIGNIFICANT CHANGE UP (ref 3.8–10.5)
WBC # FLD AUTO: 5.7 K/UL — SIGNIFICANT CHANGE UP (ref 3.8–10.5)
WBC UR QL: 20 /HPF — HIGH (ref 0–5)

## 2024-10-24 PROCEDURE — 85025 COMPLETE CBC W/AUTO DIFF WBC: CPT

## 2024-10-24 PROCEDURE — 83036 HEMOGLOBIN GLYCOSYLATED A1C: CPT

## 2024-10-24 PROCEDURE — G0463: CPT

## 2024-10-24 PROCEDURE — 80048 BASIC METABOLIC PNL TOTAL CA: CPT

## 2024-10-24 PROCEDURE — 81001 URINALYSIS AUTO W/SCOPE: CPT

## 2024-10-24 PROCEDURE — 87086 URINE CULTURE/COLONY COUNT: CPT

## 2024-10-24 PROCEDURE — 36415 COLL VENOUS BLD VENIPUNCTURE: CPT

## 2024-10-24 PROCEDURE — 71046 X-RAY EXAM CHEST 2 VIEWS: CPT

## 2024-10-24 PROCEDURE — 71046 X-RAY EXAM CHEST 2 VIEWS: CPT | Mod: 26

## 2024-10-24 NOTE — H&P PST ADULT - HISTORY OF PRESENT ILLNESS
54 yr old female presents with history of fibromyalgia, kidney stone a few years ago, Breast cancer 2014 ( s/p bilateral mastectomy / Tram flap)  DVT/ PE post op and recent Sepsis. Pt went to the ER on 9/23/24 feeling achy and weak , diagnosed with SEPSIS , RLL pneumonia noted and 3mm calculus proximal left ureter  with mild hydronephrosis noted on CT scan. As per pt ureteral stent was placed and pt treated with IV antibiotics in the hospital for 3 days then was discharged. Pt works as a teacher in [employer] , Pt c/o dysuria with urination and also has frequency , feels very uncomfortable, takes motrin with some relief . Denies any cough or fever denies any CP or palp. Pt is scheduled for left ureteroscopy with laser , possible left ureteral stent placement on 10/31/24 with DR. Paiz.

## 2024-10-24 NOTE — H&P PST ADULT - ASSESSMENT
54 yr old female presents with history of fibromyalgia, kidney stone a few years ago, Breast cancer  ( s/p bilateral mastectomy / Tram flap)  DVT/ PE post op and recent Sepsis. Pt went to the ER on 24 feeling achy and weak , diagnosed with SEPSIS , RLL pneumonia noted and 3mm calculus proximal left ureter  with mild hydronephrosis noted on CT scan. As per pt ureteral stent was placed and pt treated with IV antibiotics in the hospital for 3 days then was discharged. Pt works as a teacher in [employer] , Pt c/o dysuria with urination and also has frequency , feels very uncomfortable, takes motrin with some relief . Denies any cough or fever denies any CP or palp. Pt is scheduled for left ureteroscopy with laser , possible left ureteral stent placement on 10/31/24 with DR. Paiz. medical clearance to be obtained. Hold motrin / ASA/ vitamins 1 week preop, HOld Zepbound 1 week preop ( hold 10/26/24 dose)   OPIOID RISK TOOL    JEREMY EACH BOX THAT APPLIES AND ADD TOTALS AT THE END    FAMILY HISTORY OF SUBSTANCE ABUSE                 FEMALE         MALE                                                Alcohol                             [  ]1 pt          [  ]3pts                                               Illegal Durgs                     [  ]2 pts        [  ]3pts                                               Rx Drugs                           [  ]4 pts        [  ]4 pts    PERSONAL HISTORY OF SUBSTANCE ABUSE                                                                                          Alcohol                             [  ]3 pts       [  ]3 pts                                               Illegal Durgs                     [  ]4 pts        [  ]4 pts                                               Rx Drugs                           [  ]5 pts        [  ]5 pts    AGE BETWEEN 16-45 YEARS                                      [  ]1 pt         [  ]1 pt    HISTORY OF PREADOLESCENT   SEXUAL ABUSE                                                             [  ]3 pts        [  ]0pts    PSYCHOLOGICAL DISEASE                     ADD, OCD, Bipolar, Schizophrenia        [  ]2 pts         [  ]2 pts                      Depression                                               [  ]1 pt           [  ]1 pt           SCORING TOTAL   (add numbers and type here)              (**0*)                                     A score of 3 or lower indicated LOW risk for future opiod abuse  A score of 4 to 7 indicated moderate risk for future opiod abuse  A score of 8 or higher indicates a high risk for opiod abuse  CAPRINI SCORE    AGE RELATED RISK FACTORS                                                             [ X] Age 41-60 years                                            (1 Point)  [ ] Age: 61-74 years                                           (2 Points)                 [ ] Age= 75 years                                                (3 Points)             DISEASE RELATED RISK FACTORS                                                       [ ] Edema in the lower extremities                 (1 Point)                     [ ] Varicose veins                                               (1 Point)                                 [X ] BMI > 25 Kg/m2                                            (1 Point)                                  [ ] Serious infection (ie PNA)                            (1 Point)                     [ ] Lung disease ( COPD, Emphysema)            (1 Point)                                                                          [ ] Acute myocardial infarction                         (1 Point)                  [ ] Congestive heart failure (in the previous month)  (1 Point)         [ ] Inflammatory bowel disease                            (1 Point)                  [ ] Central venous access, PICC or Port               (2 points)       (within the last month)                                                                [ ] Stroke (in the previous month)                        (5 Points)    [X ] Previous or present malignancy                       (2 points)                                                                                                                                                         HEMATOLOGY RELATED FACTORS                                                         [X ] Prior episodes of VTE                                     (3 Points)                     [ ] Positive family history for VTE                      (3 Points)                  [ ] Prothrombin 19772 A                                     (3 Points)                     [ ] Factor V Leiden                                                (3 Points)                        [ ] Lupus anticoagulants                                      (3 Points)                                                           [ ] Anticardiolipin antibodies                              (3 Points)                                                       [ ] High homocysteine in the blood                   (3 Points)                                             [ ] Other congenital or acquired thrombophilia      (3 Points)                                                [ ] Heparin induced thrombocytopenia                  (3 Points)                                        MOBILITY RELATED FACTORS  [ ] Bed rest                                                         (1 Point)  [ ] Plaster cast                                                    (2 points)  [ ] Bed bound for more than 72 hours           (2 Points)    GENDER SPECIFIC FACTORS  [ ] Pregnancy or had a baby within the last month   (1 Point)  [ ] Post-partum < 6 weeks                                   (1 Point)  [ ] Hormonal therapy  or oral contraception   (1 Point)  [ ] History of pregnancy complications              (1 point)  [ ] Unexplained or recurrent              (1 Point)    OTHER RISK FACTORS                                           (1 Point)  [ ] BMI >40, smoking, diabetes requiring insulin, chemotherapy  blood transfusions and length of surgery over 2 hours    SURGERY RELATED RISK FACTORS  [ ]  Section within the last month     (1 Point)  [ ] Minor surgery                                                  (1 Point)  [ ] Arthroscopic surgery                                       (2 Points)  [ x] Planned major surgery lasting more            (2 Points)      than 45 minutes     [ ] Elective hip or knee joint replacement       (5 points)       surgery                                                TRAUMA RELATED RISK FACTORS  [ ] Fracture of the hip, pelvis, or leg                       (5 Points)  [ ] Spinal cord injury resulting in paralysis             (5 points)       (in the previous month)    [ ] Paralysis  (less than 1 month)                             (5 Points)  [ ] Multiple Trauma within 1 month                        (5 Points)    Total Score [    9    ]    Caprini Score 0-2: Low Risk, NO VTE prophylaxis required for most patients, encourage ambulation  Caprini Score 3-6: Moderate Risk , pharmacologic VTE prophylaxis is indicated for most patients (in the absence of contraindications)  Caprini Score Greater than or =7: High risk, pharmocologic VTE prophylaxis indicated for most patients (in the absence of contraindications)

## 2024-10-24 NOTE — H&P PST ADULT - NSICDXPASTMEDICALHX_GEN_ALL_CORE_FT
PAST MEDICAL HISTORY:  Breast cancer     Renal calculi      PAST MEDICAL HISTORY:  Breast cancer     DVT, lower extremity     H/O fibromyalgia     Pneumonia     Pulmonary embolism     Renal calculi

## 2024-10-24 NOTE — H&P PST ADULT - PROBLEM SELECTOR PLAN 1
Pt is scheduled for left ureteroscopy with laser , possible left ureteral stent placement on 10/31/24 with DR. Paiz. medical clearance to be obtained.   Hold motrin / ASA/ vitamins 1 week preop,   HOld Zepbound 1 week preop ( hold 10/26/24 dose)

## 2024-10-24 NOTE — H&P PST ADULT - NSANTHOSAYNRD_GEN_A_CORE
No. JAISON screening performed.  STOP BANG Legend: 0-2 = LOW Risk; 3-4 = INTERMEDIATE Risk; 5-8 = HIGH Risk

## 2024-10-24 NOTE — H&P PST ADULT - NSICDXPASTSURGICALHX_GEN_ALL_CORE_FT
PAST SURGICAL HISTORY:  H/O bilateral mastectomy     H/O bilateral oophorectomy     H/O  section     S/P ureteral stent placement      PAST SURGICAL HISTORY:  H/O bilateral mastectomy     H/O bilateral oophorectomy     H/O  section     S/P TRAM (transverse rectus abdominis muscle) flap breast reconstruction     S/P ureteral stent placement

## 2024-10-24 NOTE — H&P PST ADULT - NSICDXFAMILYHX_GEN_ALL_CORE_FT
FAMILY HISTORY:  No pertinent family history in first degree relatives     FAMILY HISTORY:  Sibling  Still living? Unknown  Family history of breast cancer, Age at diagnosis: Age Unknown

## 2024-10-27 LAB
CULTURE RESULTS: SIGNIFICANT CHANGE UP
SPECIMEN SOURCE: SIGNIFICANT CHANGE UP

## 2024-10-29 NOTE — ED PROVIDER NOTE - NS ED ROS FT
[FreeTextEntry1] : ======================================================================================= 1. DM2: poorly controlled in the past: A1c 6.0% (08/19/24):       - discussed with patient therapeutic lifestyle changes to improve glucose metabolism      - continue insulin       - continue Ozempic 0.5mg sc q week       - follow up with PMD and endocrinologist, Stone Mistry MD,   2. CVA: s/p fist CVA at age 33 (details unknown), s/p second CVA (thrombosis of right middle cerebral artery) with right-sided weakness and left facial droop) 03/06/23:       - follow up with a new neurologist, Joy Fernandez MD       - continue single antiplatelet therapy with aspirin 81mg po qd (despite anemia) given 2 prior CVAs  3. CAD, mild: s/p 11/05/20: CTA chest: CA+ score 14 (LM 0, LAD 14, LCX 0, RCA 0), LAD mid minimal otherwise normal:        - will pursue conservative management at this time    4. Lower extremity edema: s/p negative work up for possible cardiac etiology: secondary to nephrotic syndrome:        - continue furosemide 40mg po qd prn edema (will use sparingly given occasional orthostatic hypotension)   5. CKD IV: diabetic nephropathy with nephrotic range proteinuria: Cr. 2.84, eGFR 20 (10/14/24):         - follow up with nephrologist, Brandi Cook MD       - continue tight glycemic control       - on kidney [facility] list at [facility]  6. Anemia: secondary to menorrhagia and CKD IV:  Hgb 7.4, MCV 88.8 (10/17/24):        - follow up with hematologist, Saray Christine MD  7. 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 Corea         - will send for a lower extremity arterial sonogram to r/o PAD  8. HTN: BP not at ACC/AHA 2017 guideline target: markedly elevated in the office, ? component of "white coat" HTN:        - change valsartan 40mg po qd to vaslartan//25mg po qd (possible adverse effects of new medications discussed)        - will maintain a home blood pressure log for my review        - she will bring in her ambulatory BP device for correlation with office device        - if BP remains above target next visit will up titrate anti-HTN regimen   : dysuria, flank pain : dysuria, flank pain, hematuria

## 2024-10-30 NOTE — ASU PATIENT PROFILE, ADULT - NSICDXPASTMEDICALHX_GEN_ALL_CORE_FT
PAST MEDICAL HISTORY:  Breast cancer     DVT, lower extremity     H/O fibromyalgia     Pneumonia     Pulmonary embolism     Renal calculi

## 2024-10-30 NOTE — ASU PATIENT PROFILE, ADULT - NSICDXPASTSURGICALHX_GEN_ALL_CORE_FT
PAST SURGICAL HISTORY:  H/O bilateral mastectomy     H/O bilateral oophorectomy     H/O  section     S/P TRAM (transverse rectus abdominis muscle) flap breast reconstruction     S/P ureteral stent placement

## 2024-10-31 ENCOUNTER — RESULT REVIEW (OUTPATIENT)
Age: 54
End: 2024-10-31

## 2024-10-31 ENCOUNTER — TRANSCRIPTION ENCOUNTER (OUTPATIENT)
Age: 54
End: 2024-10-31

## 2024-10-31 ENCOUNTER — APPOINTMENT (OUTPATIENT)
Dept: UROLOGY | Facility: HOSPITAL | Age: 54
End: 2024-10-31

## 2024-10-31 ENCOUNTER — OUTPATIENT (OUTPATIENT)
Dept: OUTPATIENT SERVICES | Facility: HOSPITAL | Age: 54
LOS: 1 days | End: 2024-10-31
Payer: COMMERCIAL

## 2024-10-31 VITALS
HEART RATE: 76 BPM | OXYGEN SATURATION: 100 % | SYSTOLIC BLOOD PRESSURE: 146 MMHG | RESPIRATION RATE: 12 BRPM | DIASTOLIC BLOOD PRESSURE: 85 MMHG

## 2024-10-31 VITALS
HEIGHT: 64 IN | SYSTOLIC BLOOD PRESSURE: 117 MMHG | DIASTOLIC BLOOD PRESSURE: 84 MMHG | WEIGHT: 172.62 LBS | RESPIRATION RATE: 18 BRPM | HEART RATE: 72 BPM | OXYGEN SATURATION: 98 % | TEMPERATURE: 97 F

## 2024-10-31 DIAGNOSIS — Z98.890 OTHER SPECIFIED POSTPROCEDURAL STATES: Chronic | ICD-10-CM

## 2024-10-31 DIAGNOSIS — Z90.13 ACQUIRED ABSENCE OF BILATERAL BREASTS AND NIPPLES: Chronic | ICD-10-CM

## 2024-10-31 DIAGNOSIS — N20.1 CALCULUS OF URETER: ICD-10-CM

## 2024-10-31 DIAGNOSIS — Z98.891 HISTORY OF UTERINE SCAR FROM PREVIOUS SURGERY: Chronic | ICD-10-CM

## 2024-10-31 DIAGNOSIS — Z96.0 PRESENCE OF UROGENITAL IMPLANTS: Chronic | ICD-10-CM

## 2024-10-31 DIAGNOSIS — Z90.722 ACQUIRED ABSENCE OF OVARIES, BILATERAL: Chronic | ICD-10-CM

## 2024-10-31 PROCEDURE — C2617: CPT

## 2024-10-31 PROCEDURE — C1758: CPT

## 2024-10-31 PROCEDURE — C1889: CPT

## 2024-10-31 PROCEDURE — 88300 SURGICAL PATH GROSS: CPT

## 2024-10-31 PROCEDURE — 52356 CYSTO/URETERO W/LITHOTRIPSY: CPT | Mod: LT

## 2024-10-31 PROCEDURE — 82365 CALCULUS SPECTROSCOPY: CPT

## 2024-10-31 PROCEDURE — C9399: CPT

## 2024-10-31 PROCEDURE — 88300 SURGICAL PATH GROSS: CPT | Mod: 26

## 2024-10-31 PROCEDURE — C1769: CPT

## 2024-10-31 DEVICE — URETERAL CATH OPEN END FLEXI-TIP 5FR .038" X 70CM: Type: IMPLANTABLE DEVICE | Site: LEFT | Status: FUNCTIONAL

## 2024-10-31 DEVICE — GUIDEWIRE NICORE NITINOL STRAIGHT .035" X 150CM: Type: IMPLANTABLE DEVICE | Site: LEFT | Status: FUNCTIONAL

## 2024-10-31 DEVICE — LASER FIBER SOLTIVE 200: Type: IMPLANTABLE DEVICE | Site: LEFT | Status: FUNCTIONAL

## 2024-10-31 DEVICE — URETERAL STENT CONTOUR 6FR 22CM: Type: IMPLANTABLE DEVICE | Site: LEFT | Status: FUNCTIONAL

## 2024-10-31 DEVICE — STONE BASKET ZEROTIP NITINOL 4-WIRE 1.9FR 120CM X 12MM: Type: IMPLANTABLE DEVICE | Site: LEFT | Status: FUNCTIONAL

## 2024-10-31 RX ORDER — HYDROMORPHONE HCL/0.9% NACL/PF 6 MG/30 ML
0.5 PATIENT CONTROLLED ANALGESIA SYRINGE INTRAVENOUS ONCE
Refills: 0 | Status: DISCONTINUED | OUTPATIENT
Start: 2024-10-31 | End: 2024-10-31

## 2024-10-31 RX ORDER — ONDANSETRON HYDROCHLORIDE 2 MG/ML
4 INJECTION, SOLUTION INTRAMUSCULAR; INTRAVENOUS ONCE
Refills: 0 | Status: DISCONTINUED | OUTPATIENT
Start: 2024-10-31 | End: 2024-10-31

## 2024-10-31 RX ORDER — DULOXETINE HYDROCHLORIDE 30 MG/1
1 CAPSULE, DELAYED RELEASE ORAL
Refills: 0 | DISCHARGE

## 2024-10-31 RX ORDER — FENTANYL CITRAT/DEXTROSE 5%/PF 1250MCG/50
25 PATIENT CONTROLLED ANALGESIA SYRINGE INTRAVENOUS ONCE
Refills: 0 | Status: DISCONTINUED | OUTPATIENT
Start: 2024-10-31 | End: 2024-10-31

## 2024-10-31 RX ORDER — CEPHALEXIN 125 MG/5ML
1 SUSPENSION, RECONSTITUTED, ORAL (ML) ORAL
Qty: 15 | Refills: 0
Start: 2024-10-31 | End: 2024-11-04

## 2024-10-31 RX ORDER — KETOROLAC TROMETHAMINE 30 MG/ML
30 INJECTION INTRAMUSCULAR; INTRAVENOUS ONCE
Refills: 0 | Status: DISCONTINUED | OUTPATIENT
Start: 2024-10-31 | End: 2024-10-31

## 2024-10-31 RX ORDER — CEFAZOLIN SODIUM 1 G
2000 VIAL (EA) INJECTION ONCE
Refills: 0 | Status: DISCONTINUED | OUTPATIENT
Start: 2024-10-31 | End: 2024-10-31

## 2024-10-31 RX ORDER — ACETAMINOPHEN 500 MG
975 TABLET ORAL ONCE
Refills: 0 | Status: COMPLETED | OUTPATIENT
Start: 2024-10-31 | End: 2024-10-31

## 2024-10-31 RX ADMIN — Medication 975 MILLIGRAM(S): at 16:00

## 2024-10-31 RX ADMIN — KETOROLAC TROMETHAMINE 30 MILLIGRAM(S): 30 INJECTION INTRAMUSCULAR; INTRAVENOUS at 19:00

## 2024-10-31 NOTE — BRIEF OPERATIVE NOTE - NSICDXBRIEFPROCEDURE_GEN_ALL_CORE_FT
PROCEDURES:  Ureteroscopy with laser lithotripsy and stent placement 31-Oct-2024 18:40:10  Cedric Paiz

## 2024-10-31 NOTE — ASU DISCHARGE PLAN (ADULT/PEDIATRIC) - FINANCIAL ASSISTANCE
[facility] provides services at a reduced cost to those who are determined to be eligible through [facility]’s financial assistance program. Information regarding [facility]’s financial assistance program can be found by going to [email or web address] or by calling [phone].

## 2024-10-31 NOTE — ASU PREOP CHECKLIST - NS PREOP CHK CHLOROHEX WASH
in ASU: I have reviewed and confirmed nurses' notes for patient's medications, allergies, medical history, and surgical history.

## 2024-10-31 NOTE — ASU DISCHARGE PLAN (ADULT/PEDIATRIC) - CARE PROVIDER_API CALL
Cedric Paiz  Urology  [address] [place] [id number]  Phone: [phone]  Fax: [phone]  Follow Up Time: 1 week

## 2024-11-04 LAB — SURGICAL PATHOLOGY STUDY: SIGNIFICANT CHANGE UP

## 2024-11-13 LAB
CELL MATERIAL STONE EST-MCNT: SIGNIFICANT CHANGE UP
LABORATORY COMMENT REPORT: SIGNIFICANT CHANGE UP
NIDUS STONE QN: SIGNIFICANT CHANGE UP

## 2025-06-26 ENCOUNTER — EMERGENCY (EMERGENCY)
Facility: HOSPITAL | Age: 55
LOS: 1 days | End: 2025-06-26
Attending: EMERGENCY MEDICINE
Payer: COMMERCIAL

## 2025-06-26 VITALS
SYSTOLIC BLOOD PRESSURE: 174 MMHG | HEART RATE: 86 BPM | WEIGHT: 165.57 LBS | DIASTOLIC BLOOD PRESSURE: 101 MMHG | TEMPERATURE: 98 F | OXYGEN SATURATION: 98 % | RESPIRATION RATE: 20 BRPM

## 2025-06-26 DIAGNOSIS — Z96.0 PRESENCE OF UROGENITAL IMPLANTS: Chronic | ICD-10-CM

## 2025-06-26 DIAGNOSIS — Z98.891 HISTORY OF UTERINE SCAR FROM PREVIOUS SURGERY: Chronic | ICD-10-CM

## 2025-06-26 DIAGNOSIS — Z90.722 ACQUIRED ABSENCE OF OVARIES, BILATERAL: Chronic | ICD-10-CM

## 2025-06-26 DIAGNOSIS — Z90.13 ACQUIRED ABSENCE OF BILATERAL BREASTS AND NIPPLES: Chronic | ICD-10-CM

## 2025-06-26 DIAGNOSIS — Z98.890 OTHER SPECIFIED POSTPROCEDURAL STATES: Chronic | ICD-10-CM

## 2025-06-26 PROCEDURE — 99284 EMERGENCY DEPT VISIT MOD MDM: CPT

## 2025-06-26 PROCEDURE — 93010 ELECTROCARDIOGRAM REPORT: CPT

## 2025-06-26 NOTE — ED ADULT NURSE NOTE - OBJECTIVE STATEMENT
Patient comes to ED c/o severe right sided abdominal pain that radiates to lower back that started today at 1400. Patient states the pain has just been worsening, decreased appetite and N/V. Patient denies eating anything that might have cause this pain. Patient endorses history of kidney stones. respirations even and unlabored. pt shows no s/s of acute distress at this time. safety precautions maintained.  Patient is NSR on tele and 100% SPO2 on room air.

## 2025-06-26 NOTE — ED ADULT TRIAGE NOTE - CHIEF COMPLAINT QUOTE
c/o epigastric pain with pain radiating chest and right side of back since 1400. symptoms associated with n/v and headache. denies taking pain medications.

## 2025-06-27 VITALS
TEMPERATURE: 99 F | OXYGEN SATURATION: 100 % | DIASTOLIC BLOOD PRESSURE: 77 MMHG | HEART RATE: 82 BPM | SYSTOLIC BLOOD PRESSURE: 124 MMHG | RESPIRATION RATE: 18 BRPM

## 2025-06-27 PROBLEM — C50.919 MALIGNANT NEOPLASM OF UNSPECIFIED SITE OF UNSPECIFIED FEMALE BREAST: Chronic | Status: ACTIVE | Noted: 2024-10-24

## 2025-06-27 PROBLEM — I82.409 ACUTE EMBOLISM AND THROMBOSIS OF UNSPECIFIED DEEP VEINS OF UNSPECIFIED LOWER EXTREMITY: Chronic | Status: ACTIVE | Noted: 2024-10-24

## 2025-06-27 PROBLEM — Z87.39 PERSONAL HISTORY OF OTHER DISEASES OF THE MUSCULOSKELETAL SYSTEM AND CONNECTIVE TISSUE: Chronic | Status: ACTIVE | Noted: 2024-10-24

## 2025-06-27 PROBLEM — J18.9 PNEUMONIA, UNSPECIFIED ORGANISM: Chronic | Status: ACTIVE | Noted: 2024-10-24

## 2025-06-27 PROBLEM — I26.99 OTHER PULMONARY EMBOLISM WITHOUT ACUTE COR PULMONALE: Chronic | Status: ACTIVE | Noted: 2024-10-24

## 2025-06-27 LAB
ALBUMIN SERPL ELPH-MCNC: 4.3 G/DL — SIGNIFICANT CHANGE UP (ref 3.3–5.2)
ALP SERPL-CCNC: 83 U/L — SIGNIFICANT CHANGE UP (ref 40–120)
ALT FLD-CCNC: 12 U/L — SIGNIFICANT CHANGE UP
ANION GAP SERPL CALC-SCNC: 18 MMOL/L — HIGH (ref 5–17)
APPEARANCE UR: CLEAR — SIGNIFICANT CHANGE UP
AST SERPL-CCNC: 21 U/L — SIGNIFICANT CHANGE UP
BACTERIA # UR AUTO: NEGATIVE /HPF — SIGNIFICANT CHANGE UP
BASOPHILS # BLD AUTO: 0.02 K/UL — SIGNIFICANT CHANGE UP (ref 0–0.2)
BASOPHILS NFR BLD AUTO: 0.2 % — SIGNIFICANT CHANGE UP (ref 0–2)
BILIRUB SERPL-MCNC: 0.7 MG/DL — SIGNIFICANT CHANGE UP (ref 0.4–2)
BILIRUB UR-MCNC: NEGATIVE — SIGNIFICANT CHANGE UP
BUN SERPL-MCNC: 11.1 MG/DL — SIGNIFICANT CHANGE UP (ref 8–20)
CALCIUM SERPL-MCNC: 9.5 MG/DL — SIGNIFICANT CHANGE UP (ref 8.4–10.5)
CAST: 0 /LPF — SIGNIFICANT CHANGE UP (ref 0–4)
CHLORIDE SERPL-SCNC: 102 MMOL/L — SIGNIFICANT CHANGE UP (ref 96–108)
CO2 SERPL-SCNC: 18 MMOL/L — LOW (ref 22–29)
COLOR SPEC: YELLOW — SIGNIFICANT CHANGE UP
CREAT SERPL-MCNC: 0.68 MG/DL — SIGNIFICANT CHANGE UP (ref 0.5–1.3)
DIFF PNL FLD: ABNORMAL
EGFR: 102 ML/MIN/1.73M2 — SIGNIFICANT CHANGE UP
EGFR: 102 ML/MIN/1.73M2 — SIGNIFICANT CHANGE UP
EOSINOPHIL # BLD AUTO: 0.01 K/UL — SIGNIFICANT CHANGE UP (ref 0–0.5)
EOSINOPHIL NFR BLD AUTO: 0.1 % — SIGNIFICANT CHANGE UP (ref 0–6)
GAS PNL BLDV: SIGNIFICANT CHANGE UP
GLUCOSE SERPL-MCNC: 129 MG/DL — HIGH (ref 70–99)
GLUCOSE UR QL: NEGATIVE MG/DL — SIGNIFICANT CHANGE UP
HCT VFR BLD CALC: 43.9 % — SIGNIFICANT CHANGE UP (ref 34.5–45)
HGB BLD-MCNC: 15 G/DL — SIGNIFICANT CHANGE UP (ref 11.5–15.5)
IMM GRANULOCYTES # BLD AUTO: 0.05 K/UL — SIGNIFICANT CHANGE UP (ref 0–0.07)
IMM GRANULOCYTES NFR BLD AUTO: 0.4 % — SIGNIFICANT CHANGE UP (ref 0–0.9)
KETONES UR QL: 15 MG/DL
LACTATE SERPL-SCNC: 1.5 MMOL/L — SIGNIFICANT CHANGE UP (ref 0.5–2)
LEUKOCYTE ESTERASE UR-ACNC: ABNORMAL
LIDOCAIN IGE QN: 34 U/L — SIGNIFICANT CHANGE UP (ref 22–51)
LYMPHOCYTES # BLD AUTO: 1.52 K/UL — SIGNIFICANT CHANGE UP (ref 1–3.3)
LYMPHOCYTES NFR BLD AUTO: 12.1 % — LOW (ref 13–44)
MCHC RBC-ENTMCNC: 30.9 PG — SIGNIFICANT CHANGE UP (ref 27–34)
MCHC RBC-ENTMCNC: 34.2 G/DL — SIGNIFICANT CHANGE UP (ref 32–36)
MCV RBC AUTO: 90.3 FL — SIGNIFICANT CHANGE UP (ref 80–100)
MONOCYTES # BLD AUTO: 0.52 K/UL — SIGNIFICANT CHANGE UP (ref 0–0.9)
MONOCYTES NFR BLD AUTO: 4.1 % — SIGNIFICANT CHANGE UP (ref 2–14)
NEUTROPHILS # BLD AUTO: 10.44 K/UL — HIGH (ref 1.8–7.4)
NEUTROPHILS NFR BLD AUTO: 83.1 % — HIGH (ref 43–77)
NITRITE UR-MCNC: NEGATIVE — SIGNIFICANT CHANGE UP
NRBC # BLD AUTO: 0 K/UL — SIGNIFICANT CHANGE UP (ref 0–0)
NRBC # FLD: 0 K/UL — SIGNIFICANT CHANGE UP (ref 0–0)
NRBC BLD AUTO-RTO: 0 /100 WBCS — SIGNIFICANT CHANGE UP (ref 0–0)
PH UR: 7.5 — SIGNIFICANT CHANGE UP (ref 5–8)
PLATELET # BLD AUTO: 325 K/UL — SIGNIFICANT CHANGE UP (ref 150–400)
PMV BLD: 10.5 FL — SIGNIFICANT CHANGE UP (ref 7–13)
POTASSIUM SERPL-MCNC: 3.9 MMOL/L — SIGNIFICANT CHANGE UP (ref 3.5–5.3)
POTASSIUM SERPL-SCNC: 3.9 MMOL/L — SIGNIFICANT CHANGE UP (ref 3.5–5.3)
PROT SERPL-MCNC: 7.6 G/DL — SIGNIFICANT CHANGE UP (ref 6.6–8.7)
PROT UR-MCNC: NEGATIVE MG/DL — SIGNIFICANT CHANGE UP
RBC # BLD: 4.86 M/UL — SIGNIFICANT CHANGE UP (ref 3.8–5.2)
RBC # FLD: 12.8 % — SIGNIFICANT CHANGE UP (ref 10.3–14.5)
RBC CASTS # UR COMP ASSIST: 1 /HPF — SIGNIFICANT CHANGE UP (ref 0–4)
SODIUM SERPL-SCNC: 138 MMOL/L — SIGNIFICANT CHANGE UP (ref 135–145)
SP GR SPEC: 1.01 — SIGNIFICANT CHANGE UP (ref 1–1.03)
SQUAMOUS # UR AUTO: 1 /HPF — SIGNIFICANT CHANGE UP (ref 0–5)
UROBILINOGEN FLD QL: 0.2 MG/DL — SIGNIFICANT CHANGE UP (ref 0.2–1)
WBC # BLD: 12.56 K/UL — HIGH (ref 3.8–10.5)
WBC # FLD AUTO: 12.56 K/UL — HIGH (ref 3.8–10.5)
WBC UR QL: 14 /HPF — HIGH (ref 0–5)

## 2025-06-27 PROCEDURE — 82435 ASSAY OF BLOOD CHLORIDE: CPT

## 2025-06-27 PROCEDURE — 74176 CT ABD & PELVIS W/O CONTRAST: CPT

## 2025-06-27 PROCEDURE — 87086 URINE CULTURE/COLONY COUNT: CPT

## 2025-06-27 PROCEDURE — 36415 COLL VENOUS BLD VENIPUNCTURE: CPT

## 2025-06-27 PROCEDURE — 74176 CT ABD & PELVIS W/O CONTRAST: CPT | Mod: 26

## 2025-06-27 PROCEDURE — 82330 ASSAY OF CALCIUM: CPT

## 2025-06-27 PROCEDURE — 96375 TX/PRO/DX INJ NEW DRUG ADDON: CPT

## 2025-06-27 PROCEDURE — 99285 EMERGENCY DEPT VISIT HI MDM: CPT | Mod: 25

## 2025-06-27 PROCEDURE — 84295 ASSAY OF SERUM SODIUM: CPT

## 2025-06-27 PROCEDURE — 85025 COMPLETE CBC W/AUTO DIFF WBC: CPT

## 2025-06-27 PROCEDURE — 84132 ASSAY OF SERUM POTASSIUM: CPT

## 2025-06-27 PROCEDURE — 96374 THER/PROPH/DIAG INJ IV PUSH: CPT

## 2025-06-27 PROCEDURE — 83690 ASSAY OF LIPASE: CPT

## 2025-06-27 PROCEDURE — 83605 ASSAY OF LACTIC ACID: CPT

## 2025-06-27 PROCEDURE — 85018 HEMOGLOBIN: CPT

## 2025-06-27 PROCEDURE — 80053 COMPREHEN METABOLIC PANEL: CPT

## 2025-06-27 PROCEDURE — 82947 ASSAY GLUCOSE BLOOD QUANT: CPT

## 2025-06-27 PROCEDURE — 81001 URINALYSIS AUTO W/SCOPE: CPT

## 2025-06-27 PROCEDURE — 93005 ELECTROCARDIOGRAM TRACING: CPT

## 2025-06-27 PROCEDURE — 82803 BLOOD GASES ANY COMBINATION: CPT

## 2025-06-27 PROCEDURE — 85014 HEMATOCRIT: CPT

## 2025-06-27 RX ORDER — KETOROLAC TROMETHAMINE 30 MG/ML
15 INJECTION, SOLUTION INTRAMUSCULAR; INTRAVENOUS ONCE
Refills: 0 | Status: DISCONTINUED | OUTPATIENT
Start: 2025-06-27 | End: 2025-06-27

## 2025-06-27 RX ORDER — CEFPODOXIME PROXETIL 200 MG/1
1 TABLET, FILM COATED ORAL
Qty: 14 | Refills: 0
Start: 2025-06-27 | End: 2025-07-03

## 2025-06-27 RX ORDER — CEFTRIAXONE 500 MG/1
1000 INJECTION, POWDER, FOR SOLUTION INTRAMUSCULAR; INTRAVENOUS ONCE
Refills: 0 | Status: COMPLETED | OUTPATIENT
Start: 2025-06-27 | End: 2025-06-27

## 2025-06-27 RX ORDER — ONDANSETRON HCL/PF 4 MG/2 ML
4 VIAL (ML) INJECTION ONCE
Refills: 0 | Status: COMPLETED | OUTPATIENT
Start: 2025-06-27 | End: 2025-06-27

## 2025-06-27 RX ADMIN — Medication 1000 MILLILITER(S): at 00:16

## 2025-06-27 RX ADMIN — Medication 1000 MILLILITER(S): at 02:20

## 2025-06-27 RX ADMIN — Medication 4 MILLIGRAM(S): at 00:15

## 2025-06-27 RX ADMIN — CEFTRIAXONE 1000 MILLIGRAM(S): 500 INJECTION, POWDER, FOR SOLUTION INTRAMUSCULAR; INTRAVENOUS at 03:48

## 2025-06-27 RX ADMIN — KETOROLAC TROMETHAMINE 15 MILLIGRAM(S): 30 INJECTION, SOLUTION INTRAMUSCULAR; INTRAVENOUS at 01:00

## 2025-06-27 RX ADMIN — KETOROLAC TROMETHAMINE 15 MILLIGRAM(S): 30 INJECTION, SOLUTION INTRAMUSCULAR; INTRAVENOUS at 00:16

## 2025-06-27 NOTE — ED PROVIDER NOTE - PATIENT PORTAL LINK FT
You can access the FollowMyHealth Patient Portal offered by [facility] by registering at the following website: [email or web address]. By joining [employer]’s FollowMyHealth portal, you will also be able to view your health information using other applications (apps) compatible with our system.

## 2025-06-27 NOTE — ED PROVIDER NOTE - PHYSICAL EXAMINATION
Gen: AAOx3, uncomfortable and restless in bed.   HEENT: Normocephalic atraumatic. EOMI. No scleral icterus. Moist mucus membranes.  CV: RRR. Audible S1 and S2. No murmurs. 2+ radial and PT pulses   Pulm: Clear to auscultation bilaterally. No wheezes, rales, or rhonchi. No accessory muscle use or respiratory distress.  Abdomen: soft, normoactive BS, non distended, +RUQ tender, no rebound, no guarding  Musculoskeletal:  Moving all extremities equally. No gross deformity. No tenderness to palpation.  Skin: No rashes or lesions. Warm.  Neurologic: No focal neurological deficits. CN II-XII grossly intact.  : + R CVA tenderness  Psych: Appropriate mood and affect. Cooperative.

## 2025-06-27 NOTE — ED PROVIDER NOTE - NSFOLLOWUPINSTRUCTIONS_ED_ALL_ED_FT
- Take vantin twice a day for a week.   - Follow up with your urologist.     Urinary Tract Infection    A urinary tract infection (UTI) is an infection of any part of the urinary tract, which includes the kidneys, ureters, bladder, and urethra. Risk factors include ignoring your need to urinate, wiping back to front if female, being an uncircumcised male, and having diabetes or a weak immune system. Symptoms include frequent urination, pain or burning with urination, foul smelling urine, cloudy urine, pain in the lower abdomen, blood in the urine, and fever. If you were prescribed an antibiotic medicine, take it as told by your health care provider. Do not stop taking the antibiotic even if you start to feel better.    SEEK IMMEDIATE MEDICAL CARE IF YOU HAVE ANY OF THE FOLLOWING SYMPTOMS: severe back or abdominal pain, fever, inability to keep fluids or medicine down, dizziness/lightheadedness, or a change in mental status.

## 2025-06-27 NOTE — ED PROVIDER NOTE - PROGRESS NOTE DETAILS
pain resolved, ct with cystitis and ascending L ureteritis. Will dc with vantin and urology follow up.

## 2025-06-27 NOTE — ED PROVIDER NOTE - CARE PROVIDER_API CALL
Ti Mcbride [place]  Urology  [address] [place] [address]  Phone: [phone]  Fax: [phone]  Follow Up Time: Routine

## 2025-06-27 NOTE — ED PROVIDER NOTE - ATTENDING CONTRIBUTION TO CARE
Patient is a 54yo F with PMHx of kidney stones who presents to the ED complaining of RUQ/R flank pain since 2pm. Pt found to have cystitis, treated with abx,

## 2025-06-27 NOTE — ED PROVIDER NOTE - CLINICAL SUMMARY MEDICAL DECISION MAKING FREE TEXT BOX
Patient is a 56yo F with PMHx of kidney stones who presents to the ED complaining of RUQ/R flank pain since 2pm. Will get labs and CT renal stone bosch.

## 2025-06-27 NOTE — ED PROVIDER NOTE - OBJECTIVE STATEMENT
Patient is a 56yo F with PMHx of kidney stones who presents to the ED complaining of RUQ/R flank pain since 2pm. Patient states it feels similar to her prior kidney stone but higher up and more intense. Denies urinary symptoms or hematuria. Vomited once, +nausea. Denies fevers, diarrhea, chest pain, shortness of breath.

## 2025-06-28 LAB
CULTURE RESULTS: SIGNIFICANT CHANGE UP
SPECIMEN SOURCE: SIGNIFICANT CHANGE UP

## (undated) DEVICE — Device

## (undated) DEVICE — DRAPE C ARM UNIVERSAL

## (undated) DEVICE — PREP BETADINE KIT

## (undated) DEVICE — TUBING TUR 2 PRONG

## (undated) DEVICE — PORT BIOPSY

## (undated) DEVICE — PACK CYSTOSCOPY TIBURON

## (undated) DEVICE — GLV 8 PROTEXIS (WHITE)

## (undated) DEVICE — GOWN XXL

## (undated) DEVICE — PRESSURE INFUSOR BAG 3000ML

## (undated) DEVICE — VENODYNE/SCD SLEEVE CALF MEDIUM

## (undated) DEVICE — TUBING IRR SET FOR CYSTOSCOPY 77"

## (undated) DEVICE — SOL IRR BAG H2O 3000ML

## (undated) DEVICE — WARMING BLANKET UPPER ADULT

## (undated) DEVICE — SOL IRR BAG NS 0.9% 3000ML

## (undated) DEVICE — IRR BULB PATHFINDER + 10"

## (undated) DEVICE — VISITEC 4X4

## (undated) DEVICE — TUBING SUCTION 20FT